# Patient Record
Sex: MALE | Race: WHITE | NOT HISPANIC OR LATINO | Employment: OTHER | URBAN - METROPOLITAN AREA
[De-identification: names, ages, dates, MRNs, and addresses within clinical notes are randomized per-mention and may not be internally consistent; named-entity substitution may affect disease eponyms.]

---

## 2019-10-25 ENCOUNTER — TRANSFERRED RECORDS (OUTPATIENT)
Dept: HEALTH INFORMATION MANAGEMENT | Facility: CLINIC | Age: 54
End: 2019-10-25

## 2020-07-02 ENCOUNTER — TRANSFERRED RECORDS (OUTPATIENT)
Dept: HEALTH INFORMATION MANAGEMENT | Facility: CLINIC | Age: 55
End: 2020-07-02

## 2020-07-08 ENCOUNTER — TRANSFERRED RECORDS (OUTPATIENT)
Dept: HEALTH INFORMATION MANAGEMENT | Facility: CLINIC | Age: 55
End: 2020-07-08

## 2023-07-31 ENCOUNTER — TRANSFERRED RECORDS (OUTPATIENT)
Dept: HEALTH INFORMATION MANAGEMENT | Facility: CLINIC | Age: 58
End: 2023-07-31

## 2023-08-07 ENCOUNTER — TRANSFERRED RECORDS (OUTPATIENT)
Dept: HEALTH INFORMATION MANAGEMENT | Facility: CLINIC | Age: 58
End: 2023-08-07

## 2023-08-24 ENCOUNTER — TRANSFERRED RECORDS (OUTPATIENT)
Dept: HEALTH INFORMATION MANAGEMENT | Facility: CLINIC | Age: 58
End: 2023-08-24

## 2023-08-24 ENCOUNTER — LAB REQUISITION (OUTPATIENT)
Dept: LAB | Facility: CLINIC | Age: 58
End: 2023-08-24
Payer: COMMERCIAL

## 2023-08-24 DIAGNOSIS — M76.899 OTHER SPECIFIED ENTHESOPATHIES OF UNSPECIFIED LOWER LIMB, EXCLUDING FOOT: ICD-10-CM

## 2023-08-24 PROCEDURE — 87077 CULTURE AEROBIC IDENTIFY: CPT | Mod: ORL,59 | Performed by: ORTHOPAEDIC SURGERY

## 2023-08-24 PROCEDURE — 87075 CULTR BACTERIA EXCEPT BLOOD: CPT | Mod: ORL | Performed by: ORTHOPAEDIC SURGERY

## 2023-08-24 PROCEDURE — 87176 TISSUE HOMOGENIZATION CULTR: CPT | Mod: ORL | Performed by: ORTHOPAEDIC SURGERY

## 2023-08-24 PROCEDURE — 87077 CULTURE AEROBIC IDENTIFY: CPT | Mod: ORL | Performed by: ORTHOPAEDIC SURGERY

## 2023-08-26 LAB — BACTERIA TISS BX CULT: ABNORMAL

## 2023-08-28 LAB
BACTERIA TISS BX CULT: ABNORMAL
BACTERIA TISS BX CULT: ABNORMAL

## 2023-08-29 LAB — BACTERIA TISS BX CULT: ABNORMAL

## 2023-08-31 LAB
BACTERIA TISS BX CULT: NORMAL

## 2023-09-07 ENCOUNTER — MEDICAL CORRESPONDENCE (OUTPATIENT)
Dept: HEALTH INFORMATION MANAGEMENT | Facility: CLINIC | Age: 58
End: 2023-09-07
Payer: COMMERCIAL

## 2023-09-07 ENCOUNTER — TRANSFERRED RECORDS (OUTPATIENT)
Dept: HEALTH INFORMATION MANAGEMENT | Facility: CLINIC | Age: 58
End: 2023-09-07

## 2023-09-08 ENCOUNTER — ANCILLARY PROCEDURE (OUTPATIENT)
Dept: OTHER | Facility: HOSPITAL | Age: 58
End: 2023-09-08
Payer: COMMERCIAL

## 2023-09-08 NOTE — PROGRESS NOTES
"    PICC Line Insertion Procedure Note  Pt. Name: Reji Solorzano  MRN:        [id number]    Procedure: Insertion of a Single Lumen  4 fr  Bard SOLO (valved) Power PICC, Lot number [id number]    Indications: Antibiotics    Contraindications : none    Procedure Details   Patient identified with 2 identifiers and \"Time Out\" conducted.  .     Central line insertion bundle followed: hand hygeine performed prior to procedure, site cleansed with cholraprep, hat, mask, sterile gloves,sterile gown worn, patient draped with maximum barrier head to toe drape, sterile field maintained.    Vein was assessed and found to be compressible and of adequate size. Two ml 1% Lidocaine administered sq to the insertion site. A 4 Fr PICC was inserted into the basilic vein of the right arm with ultrasound guidance. With one attempt(s) required to access vein.   Catheter threaded without difficulty. Good blood return noted.    Modified Seldinger Technique used for insertion.    The 8 sharps that are included in the PICC kit were accounted for and disposed of in the sharps container prior to the breakdown of the sterile field.     Catheter secured with Statlock, biopatch and Tegaderm dressing applied.    Findings:  Total catheter length  43 cm, with 1 cm exposed. Mid upper arm circumference is 43 cm. Catheter was flushed with 10 cc NS. Patient  tolerated procedure well.    Tip placement verified by 3 CG technology                  CLABSI prevention brochure left at bedside.    Patient's primary RN notified PICC is ready for use.    Comments:          Elysia Crump, RN  PICC Team    [facility]  [phone]    "

## 2023-09-11 DIAGNOSIS — B99.9 INFECTION: Primary | ICD-10-CM

## 2023-09-11 RX ORDER — EPINEPHRINE 1 MG/ML
0.3 INJECTION, SOLUTION, CONCENTRATE INTRAVENOUS EVERY 5 MIN PRN
Status: CANCELLED | OUTPATIENT
Start: 2023-09-12

## 2023-09-11 RX ORDER — ALBUTEROL SULFATE 0.83 MG/ML
2.5 SOLUTION RESPIRATORY (INHALATION)
Status: CANCELLED | OUTPATIENT
Start: 2023-09-12

## 2023-09-11 RX ORDER — METHYLPREDNISOLONE SODIUM SUCCINATE 125 MG/2ML
125 INJECTION, POWDER, LYOPHILIZED, FOR SOLUTION INTRAMUSCULAR; INTRAVENOUS
Status: CANCELLED
Start: 2023-09-12

## 2023-09-11 RX ORDER — DIPHENHYDRAMINE HYDROCHLORIDE 50 MG/ML
50 INJECTION INTRAMUSCULAR; INTRAVENOUS
Status: CANCELLED
Start: 2023-09-12

## 2023-09-11 RX ORDER — ALBUTEROL SULFATE 90 UG/1
1-2 AEROSOL, METERED RESPIRATORY (INHALATION)
Status: CANCELLED
Start: 2023-09-12

## 2023-09-11 RX ORDER — MEPERIDINE HYDROCHLORIDE 25 MG/ML
25 INJECTION INTRAMUSCULAR; INTRAVENOUS; SUBCUTANEOUS EVERY 30 MIN PRN
Status: CANCELLED | OUTPATIENT
Start: 2023-09-12

## 2023-09-11 RX ORDER — HEPARIN SODIUM,PORCINE 10 UNIT/ML
5-20 VIAL (ML) INTRAVENOUS DAILY PRN
Status: CANCELLED | OUTPATIENT
Start: 2023-09-12

## 2023-09-11 RX ORDER — HEPARIN SODIUM (PORCINE) LOCK FLUSH IV SOLN 100 UNIT/ML 100 UNIT/ML
5 SOLUTION INTRAVENOUS
Status: CANCELLED | OUTPATIENT
Start: 2023-09-12

## 2023-09-12 ENCOUNTER — LAB REQUISITION (OUTPATIENT)
Dept: LAB | Facility: HOSPITAL | Age: 58
End: 2023-09-12
Payer: COMMERCIAL

## 2023-09-12 DIAGNOSIS — M86.251: ICD-10-CM

## 2023-09-12 LAB
AST SERPL W P-5'-P-CCNC: 34 U/L (ref 0–45)
BASOPHILS # BLD AUTO: 0.1 10E3/UL (ref 0–0.2)
BASOPHILS NFR BLD AUTO: 0 %
CREAT SERPL-MCNC: 0.72 MG/DL (ref 0.67–1.17)
CRP SERPL-MCNC: 6.9 MG/L
EGFRCR SERPLBLD CKD-EPI 2021: >90 ML/MIN/1.73M2
EOSINOPHIL # BLD AUTO: 0.1 10E3/UL (ref 0–0.7)
EOSINOPHIL NFR BLD AUTO: 1 %
ERYTHROCYTE [DISTWIDTH] IN BLOOD BY AUTOMATED COUNT: 13.7 % (ref 10–15)
ERYTHROCYTE [SEDIMENTATION RATE] IN BLOOD BY WESTERGREN METHOD: 18 MM/HR (ref 0–20)
HCT VFR BLD AUTO: 38.1 % (ref 40–53)
HGB BLD-MCNC: 12.4 G/DL (ref 13.3–17.7)
IMM GRANULOCYTES # BLD: 0.1 10E3/UL
IMM GRANULOCYTES NFR BLD: 0 %
LYMPHOCYTES # BLD AUTO: 1.5 10E3/UL (ref 0.8–5.3)
LYMPHOCYTES NFR BLD AUTO: 13 %
MCH RBC QN AUTO: 28.2 PG (ref 26.5–33)
MCHC RBC AUTO-ENTMCNC: 32.5 G/DL (ref 31.5–36.5)
MCV RBC AUTO: 87 FL (ref 78–100)
MONOCYTES # BLD AUTO: 0.6 10E3/UL (ref 0–1.3)
MONOCYTES NFR BLD AUTO: 5 %
NEUTROPHILS # BLD AUTO: 9.4 10E3/UL (ref 1.6–8.3)
NEUTROPHILS NFR BLD AUTO: 81 %
NRBC # BLD AUTO: 0 10E3/UL
NRBC BLD AUTO-RTO: 0 /100
PLATELET # BLD AUTO: 341 10E3/UL (ref 150–450)
RBC # BLD AUTO: 4.4 10E6/UL (ref 4.4–5.9)
WBC # BLD AUTO: 11.7 10E3/UL (ref 4–11)

## 2023-09-12 PROCEDURE — 86140 C-REACTIVE PROTEIN: CPT | Performed by: SPECIALIST

## 2023-09-12 PROCEDURE — 84450 TRANSFERASE (AST) (SGOT): CPT | Performed by: SPECIALIST

## 2023-09-12 PROCEDURE — 85652 RBC SED RATE AUTOMATED: CPT | Performed by: SPECIALIST

## 2023-09-12 PROCEDURE — 85025 COMPLETE CBC W/AUTO DIFF WBC: CPT | Performed by: SPECIALIST

## 2023-09-12 PROCEDURE — 82565 ASSAY OF CREATININE: CPT | Performed by: SPECIALIST

## 2023-09-19 ENCOUNTER — LAB REQUISITION (OUTPATIENT)
Dept: LAB | Facility: CLINIC | Age: 58
End: 2023-09-19
Payer: COMMERCIAL

## 2023-09-19 DIAGNOSIS — M86.251: ICD-10-CM

## 2023-09-19 LAB
AST SERPL W P-5'-P-CCNC: 57 U/L (ref 0–45)
BASOPHILS # BLD AUTO: 0 10E3/UL (ref 0–0.2)
BASOPHILS NFR BLD AUTO: 1 %
CREAT SERPL-MCNC: 0.76 MG/DL (ref 0.67–1.17)
CRP SERPL-MCNC: 9.84 MG/L
EGFRCR SERPLBLD CKD-EPI 2021: >90 ML/MIN/1.73M2
EOSINOPHIL # BLD AUTO: 0.1 10E3/UL (ref 0–0.7)
EOSINOPHIL NFR BLD AUTO: 1 %
ERYTHROCYTE [DISTWIDTH] IN BLOOD BY AUTOMATED COUNT: 13.8 % (ref 10–15)
ERYTHROCYTE [SEDIMENTATION RATE] IN BLOOD BY WESTERGREN METHOD: 12 MM/HR (ref 0–20)
HCT VFR BLD AUTO: 35.1 % (ref 40–53)
HGB BLD-MCNC: 11.6 G/DL (ref 13.3–17.7)
HOLD SPECIMEN: NORMAL
IMM GRANULOCYTES # BLD: 0 10E3/UL
IMM GRANULOCYTES NFR BLD: 1 %
LYMPHOCYTES # BLD AUTO: 0.9 10E3/UL (ref 0.8–5.3)
LYMPHOCYTES NFR BLD AUTO: 10 %
MCH RBC QN AUTO: 28.7 PG (ref 26.5–33)
MCHC RBC AUTO-ENTMCNC: 33 G/DL (ref 31.5–36.5)
MCV RBC AUTO: 87 FL (ref 78–100)
MONOCYTES # BLD AUTO: 0.4 10E3/UL (ref 0–1.3)
MONOCYTES NFR BLD AUTO: 5 %
NEUTROPHILS # BLD AUTO: 7.4 10E3/UL (ref 1.6–8.3)
NEUTROPHILS NFR BLD AUTO: 82 %
NRBC # BLD AUTO: 0 10E3/UL
NRBC BLD AUTO-RTO: 0 /100
PLATELET # BLD AUTO: 218 10E3/UL (ref 150–450)
RBC # BLD AUTO: 4.04 10E6/UL (ref 4.4–5.9)
WBC # BLD AUTO: 8.8 10E3/UL (ref 4–11)

## 2023-09-19 PROCEDURE — 84450 TRANSFERASE (AST) (SGOT): CPT

## 2023-09-19 PROCEDURE — 82565 ASSAY OF CREATININE: CPT

## 2023-09-19 PROCEDURE — 85652 RBC SED RATE AUTOMATED: CPT

## 2023-09-19 PROCEDURE — 85025 COMPLETE CBC W/AUTO DIFF WBC: CPT

## 2023-09-19 PROCEDURE — 86140 C-REACTIVE PROTEIN: CPT

## 2023-09-22 ENCOUNTER — DOCUMENTATION ONLY (OUTPATIENT)
Dept: PHARMACY | Facility: CLINIC | Age: 58
End: 2023-09-22
Payer: COMMERCIAL

## 2023-09-22 NOTE — PROGRESS NOTES
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 He was wondering if he would be able to end his antibiotics a few days early so he doesn't have to do while he is out of town.       Ysabel Rodriguez RN, BSN  [facility]  [email or web address]  [phone]

## 2023-09-26 ENCOUNTER — LAB REQUISITION (OUTPATIENT)
Dept: LAB | Facility: CLINIC | Age: 58
End: 2023-09-26
Payer: COMMERCIAL

## 2023-09-26 DIAGNOSIS — M86.251: ICD-10-CM

## 2023-09-26 LAB
AST SERPL W P-5'-P-CCNC: 33 U/L (ref 0–45)
BASOPHILS # BLD AUTO: 0 10E3/UL (ref 0–0.2)
BASOPHILS NFR BLD AUTO: 0 %
CREAT SERPL-MCNC: 0.78 MG/DL (ref 0.67–1.17)
CRP SERPL-MCNC: <3 MG/L
EGFRCR SERPLBLD CKD-EPI 2021: >90 ML/MIN/1.73M2
EOSINOPHIL # BLD AUTO: 0.1 10E3/UL (ref 0–0.7)
EOSINOPHIL NFR BLD AUTO: 1 %
ERYTHROCYTE [DISTWIDTH] IN BLOOD BY AUTOMATED COUNT: 14.6 % (ref 10–15)
ERYTHROCYTE [SEDIMENTATION RATE] IN BLOOD BY WESTERGREN METHOD: 11 MM/HR (ref 0–20)
HCT VFR BLD AUTO: 37.5 % (ref 40–53)
HGB BLD-MCNC: 12.3 G/DL (ref 13.3–17.7)
HOLD SPECIMEN: NORMAL
IMM GRANULOCYTES # BLD: 0 10E3/UL
IMM GRANULOCYTES NFR BLD: 0 %
LYMPHOCYTES # BLD AUTO: 0.9 10E3/UL (ref 0.8–5.3)
LYMPHOCYTES NFR BLD AUTO: 12 %
MCH RBC QN AUTO: 28.9 PG (ref 26.5–33)
MCHC RBC AUTO-ENTMCNC: 32.8 G/DL (ref 31.5–36.5)
MCV RBC AUTO: 88 FL (ref 78–100)
MONOCYTES # BLD AUTO: 0.4 10E3/UL (ref 0–1.3)
MONOCYTES NFR BLD AUTO: 5 %
NEUTROPHILS # BLD AUTO: 6.6 10E3/UL (ref 1.6–8.3)
NEUTROPHILS NFR BLD AUTO: 82 %
NRBC # BLD AUTO: 0 10E3/UL
NRBC BLD AUTO-RTO: 0 /100
PLATELET # BLD AUTO: 187 10E3/UL (ref 150–450)
RBC # BLD AUTO: 4.26 10E6/UL (ref 4.4–5.9)
WBC # BLD AUTO: 8.1 10E3/UL (ref 4–11)

## 2023-09-26 PROCEDURE — 86140 C-REACTIVE PROTEIN: CPT | Performed by: SPECIALIST

## 2023-09-26 PROCEDURE — 85025 COMPLETE CBC W/AUTO DIFF WBC: CPT | Performed by: SPECIALIST

## 2023-09-26 PROCEDURE — 82565 ASSAY OF CREATININE: CPT | Performed by: SPECIALIST

## 2023-09-26 PROCEDURE — 84450 TRANSFERASE (AST) (SGOT): CPT | Performed by: SPECIALIST

## 2023-09-26 PROCEDURE — 85652 RBC SED RATE AUTOMATED: CPT | Performed by: SPECIALIST

## 2023-10-03 ENCOUNTER — LAB REQUISITION (OUTPATIENT)
Dept: LAB | Facility: CLINIC | Age: 58
End: 2023-10-03
Payer: COMMERCIAL

## 2023-10-03 DIAGNOSIS — M86.251: ICD-10-CM

## 2023-10-03 LAB
AST SERPL W P-5'-P-CCNC: 43 U/L (ref 0–45)
BASO+EOS+MONOS # BLD AUTO: ABNORMAL 10*3/UL
BASO+EOS+MONOS NFR BLD AUTO: ABNORMAL %
BASOPHILS # BLD AUTO: 0 10E3/UL (ref 0–0.2)
BASOPHILS NFR BLD AUTO: 0 %
CREAT SERPL-MCNC: 0.74 MG/DL (ref 0.67–1.17)
CRP SERPL-MCNC: 3.97 MG/L
EGFRCR SERPLBLD CKD-EPI 2021: >90 ML/MIN/1.73M2
EOSINOPHIL # BLD AUTO: 0 10E3/UL (ref 0–0.7)
EOSINOPHIL NFR BLD AUTO: 1 %
ERYTHROCYTE [DISTWIDTH] IN BLOOD BY AUTOMATED COUNT: 14.6 % (ref 10–15)
ERYTHROCYTE [SEDIMENTATION RATE] IN BLOOD BY WESTERGREN METHOD: 7 MM/HR (ref 0–20)
HCT VFR BLD AUTO: 37.5 % (ref 40–53)
HGB BLD-MCNC: 12.4 G/DL (ref 13.3–17.7)
HOLD SPECIMEN: NORMAL
IMM GRANULOCYTES # BLD: 0 10E3/UL
IMM GRANULOCYTES NFR BLD: 0 %
LYMPHOCYTES # BLD AUTO: 1 10E3/UL (ref 0.8–5.3)
LYMPHOCYTES NFR BLD AUTO: 14 %
MCH RBC QN AUTO: 28.9 PG (ref 26.5–33)
MCHC RBC AUTO-ENTMCNC: 33.1 G/DL (ref 31.5–36.5)
MCV RBC AUTO: 87 FL (ref 78–100)
MONOCYTES # BLD AUTO: 0.3 10E3/UL (ref 0–1.3)
MONOCYTES NFR BLD AUTO: 5 %
NEUTROPHILS # BLD AUTO: 5.3 10E3/UL (ref 1.6–8.3)
NEUTROPHILS NFR BLD AUTO: 80 %
NRBC # BLD AUTO: 0 10E3/UL
NRBC BLD AUTO-RTO: 0 /100
PLATELET # BLD AUTO: 197 10E3/UL (ref 150–450)
RBC # BLD AUTO: 4.29 10E6/UL (ref 4.4–5.9)
WBC # BLD AUTO: 6.7 10E3/UL (ref 4–11)

## 2023-10-03 PROCEDURE — 85652 RBC SED RATE AUTOMATED: CPT | Performed by: SPECIALIST

## 2023-10-03 PROCEDURE — 86140 C-REACTIVE PROTEIN: CPT | Performed by: SPECIALIST

## 2023-10-03 PROCEDURE — 82565 ASSAY OF CREATININE: CPT | Performed by: SPECIALIST

## 2023-10-03 PROCEDURE — 85025 COMPLETE CBC W/AUTO DIFF WBC: CPT | Performed by: SPECIALIST

## 2023-10-03 PROCEDURE — 84450 TRANSFERASE (AST) (SGOT): CPT | Performed by: SPECIALIST

## 2023-10-10 ENCOUNTER — LAB REQUISITION (OUTPATIENT)
Dept: LAB | Facility: HOSPITAL | Age: 58
End: 2023-10-10
Payer: COMMERCIAL

## 2023-10-10 DIAGNOSIS — M86.251: ICD-10-CM

## 2023-10-10 LAB
AST SERPL W P-5'-P-CCNC: 41 U/L (ref 0–45)
BASO+EOS+MONOS # BLD AUTO: ABNORMAL 10*3/UL
BASO+EOS+MONOS NFR BLD AUTO: ABNORMAL %
BASOPHILS # BLD AUTO: 0 10E3/UL (ref 0–0.2)
BASOPHILS NFR BLD AUTO: 0 %
CREAT SERPL-MCNC: 0.6 MG/DL (ref 0.67–1.17)
CRP SERPL-MCNC: 7 MG/L
EGFRCR SERPLBLD CKD-EPI 2021: >90 ML/MIN/1.73M2
EOSINOPHIL # BLD AUTO: 0 10E3/UL (ref 0–0.7)
EOSINOPHIL NFR BLD AUTO: 1 %
ERYTHROCYTE [DISTWIDTH] IN BLOOD BY AUTOMATED COUNT: 14.6 % (ref 10–15)
ERYTHROCYTE [SEDIMENTATION RATE] IN BLOOD BY WESTERGREN METHOD: 9 MM/HR (ref 0–20)
HCT VFR BLD AUTO: 38.3 % (ref 40–53)
HGB BLD-MCNC: 12.7 G/DL (ref 13.3–17.7)
IMM GRANULOCYTES # BLD: 0 10E3/UL
IMM GRANULOCYTES NFR BLD: 0 %
LYMPHOCYTES # BLD AUTO: 0.9 10E3/UL (ref 0.8–5.3)
LYMPHOCYTES NFR BLD AUTO: 13 %
MCH RBC QN AUTO: 28.9 PG (ref 26.5–33)
MCHC RBC AUTO-ENTMCNC: 33.2 G/DL (ref 31.5–36.5)
MCV RBC AUTO: 87 FL (ref 78–100)
MONOCYTES # BLD AUTO: 0.4 10E3/UL (ref 0–1.3)
MONOCYTES NFR BLD AUTO: 6 %
NEUTROPHILS # BLD AUTO: 5.5 10E3/UL (ref 1.6–8.3)
NEUTROPHILS NFR BLD AUTO: 80 %
NRBC # BLD AUTO: 0 10E3/UL
NRBC BLD AUTO-RTO: 0 /100
PLATELET # BLD AUTO: 215 10E3/UL (ref 150–450)
RBC # BLD AUTO: 4.4 10E6/UL (ref 4.4–5.9)
WBC # BLD AUTO: 6.8 10E3/UL (ref 4–11)

## 2023-10-10 PROCEDURE — 85652 RBC SED RATE AUTOMATED: CPT

## 2023-10-10 PROCEDURE — 84450 TRANSFERASE (AST) (SGOT): CPT

## 2023-10-10 PROCEDURE — 86140 C-REACTIVE PROTEIN: CPT

## 2023-10-10 PROCEDURE — 85014 HEMATOCRIT: CPT

## 2023-10-10 PROCEDURE — 82565 ASSAY OF CREATININE: CPT

## 2023-10-16 ENCOUNTER — LAB REQUISITION (OUTPATIENT)
Dept: LAB | Facility: CLINIC | Age: 58
End: 2023-10-16
Payer: COMMERCIAL

## 2023-10-16 DIAGNOSIS — M86.251: ICD-10-CM

## 2023-10-16 LAB
AST SERPL W P-5'-P-CCNC: 32 U/L (ref 0–45)
BASO+EOS+MONOS # BLD AUTO: ABNORMAL 10*3/UL
BASO+EOS+MONOS NFR BLD AUTO: ABNORMAL %
BASOPHILS # BLD AUTO: 0 10E3/UL (ref 0–0.2)
BASOPHILS NFR BLD AUTO: 1 %
CREAT SERPL-MCNC: 0.68 MG/DL (ref 0.67–1.17)
CRP SERPL-MCNC: <3 MG/L
EGFRCR SERPLBLD CKD-EPI 2021: >90 ML/MIN/1.73M2
EOSINOPHIL # BLD AUTO: 0 10E3/UL (ref 0–0.7)
EOSINOPHIL NFR BLD AUTO: 0 %
ERYTHROCYTE [DISTWIDTH] IN BLOOD BY AUTOMATED COUNT: 14.6 % (ref 10–15)
ERYTHROCYTE [SEDIMENTATION RATE] IN BLOOD BY WESTERGREN METHOD: 7 MM/HR (ref 0–20)
HCT VFR BLD AUTO: 36.4 % (ref 40–53)
HGB BLD-MCNC: 12 G/DL (ref 13.3–17.7)
IMM GRANULOCYTES # BLD: 0 10E3/UL
IMM GRANULOCYTES NFR BLD: 0 %
LYMPHOCYTES # BLD AUTO: 0.7 10E3/UL (ref 0.8–5.3)
LYMPHOCYTES NFR BLD AUTO: 8 %
MCH RBC QN AUTO: 28.8 PG (ref 26.5–33)
MCHC RBC AUTO-ENTMCNC: 33 G/DL (ref 31.5–36.5)
MCV RBC AUTO: 87 FL (ref 78–100)
MONOCYTES # BLD AUTO: 0.5 10E3/UL (ref 0–1.3)
MONOCYTES NFR BLD AUTO: 6 %
NEUTROPHILS # BLD AUTO: 7.3 10E3/UL (ref 1.6–8.3)
NEUTROPHILS NFR BLD AUTO: 85 %
NRBC # BLD AUTO: 0 10E3/UL
NRBC BLD AUTO-RTO: 0 /100
PLATELET # BLD AUTO: 203 10E3/UL (ref 150–450)
RBC # BLD AUTO: 4.17 10E6/UL (ref 4.4–5.9)
WBC # BLD AUTO: 8.6 10E3/UL (ref 4–11)

## 2023-10-16 PROCEDURE — 85652 RBC SED RATE AUTOMATED: CPT | Performed by: SPECIALIST

## 2023-10-16 PROCEDURE — 86140 C-REACTIVE PROTEIN: CPT | Performed by: SPECIALIST

## 2023-10-16 PROCEDURE — 84450 TRANSFERASE (AST) (SGOT): CPT | Performed by: SPECIALIST

## 2023-10-16 PROCEDURE — 82565 ASSAY OF CREATININE: CPT | Performed by: SPECIALIST

## 2023-10-16 PROCEDURE — 85025 COMPLETE CBC W/AUTO DIFF WBC: CPT | Performed by: SPECIALIST

## 2024-05-07 ENCOUNTER — TRANSFERRED RECORDS (OUTPATIENT)
Dept: HEALTH INFORMATION MANAGEMENT | Facility: CLINIC | Age: 59
End: 2024-05-07

## 2024-05-14 ENCOUNTER — TRANSFERRED RECORDS (OUTPATIENT)
Dept: HEALTH INFORMATION MANAGEMENT | Facility: CLINIC | Age: 59
End: 2024-05-14

## 2024-05-14 ENCOUNTER — LAB REQUISITION (OUTPATIENT)
Dept: LAB | Facility: CLINIC | Age: 59
End: 2024-05-14
Payer: COMMERCIAL

## 2024-05-14 DIAGNOSIS — M79.652 PAIN IN LEFT THIGH: ICD-10-CM

## 2024-05-14 LAB
BACTERIA TISS BX CULT: NORMAL
GRAM STAIN RESULT: NORMAL
GRAM STAIN RESULT: NORMAL

## 2024-05-14 PROCEDURE — 87075 CULTR BACTERIA EXCEPT BLOOD: CPT | Mod: ORL | Performed by: ORTHOPAEDIC SURGERY

## 2024-05-14 PROCEDURE — 87205 SMEAR GRAM STAIN: CPT | Mod: ORL | Performed by: ORTHOPAEDIC SURGERY

## 2024-05-14 PROCEDURE — 87070 CULTURE OTHR SPECIMN AEROBIC: CPT | Mod: ORL | Performed by: ORTHOPAEDIC SURGERY

## 2024-05-19 LAB — BACTERIA TISS BX CULT: NO GROWTH

## 2024-05-28 LAB — BACTERIA TISS BX CULT: NORMAL

## 2024-05-29 ENCOUNTER — TRANSFERRED RECORDS (OUTPATIENT)
Dept: HEALTH INFORMATION MANAGEMENT | Facility: CLINIC | Age: 59
End: 2024-05-29

## 2024-05-30 ENCOUNTER — TRANSFERRED RECORDS (OUTPATIENT)
Dept: HEALTH INFORMATION MANAGEMENT | Facility: CLINIC | Age: 59
End: 2024-05-30

## 2024-09-09 ENCOUNTER — DOCUMENTATION ONLY (OUTPATIENT)
Dept: ORTHOPEDICS | Facility: CLINIC | Age: 59
End: 2024-09-09
Payer: COMMERCIAL

## 2024-09-09 NOTE — PROGRESS NOTES
We've received an email referral for Reji. I asked him to verify what the referral is for, as the nurse just sent records and no specific referral or question. I have also left her a voicemail asking for more information on the referral - Farhana Sun with Dr Dorsey at [facility]. Reji states he has a 3rd infection since a hamstring repair surgery in 2020 and his provider said he'd like to send him to us. This is a unique referral so I scheduled him in the next available appointment spot with Dr Rogers and will check with the team to see if appropriate. I will call Reji back if we need to move the appointment date or to another provider. He understands and is grateful. Date, time and location were confirmed with the patient. With his consent, I have also sent the patient a text link to sign up for [employer].  Stacie Vega ATC

## 2024-09-10 NOTE — PROGRESS NOTES
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 He has had two previous surgeries to try and clear the infection. He has been seen by infectious disease. He grew out bacteria that we normally only see in immunocompromised patients which I believe he is not. The whole situation is complex and he may need another surgery to have a large portion of his ischium removed which is a complicated surgery which I am not capable of and usually done by a tumor surgeon. Dr. Ruggiero and I both agree another opinion by himself or Dr. Silva would be most appropriate. Please let me know if you need any further information. I am happy to talk with Dr. Silva directly. I just don't have his cell number.

## 2024-09-10 NOTE — PROGRESS NOTES
ATC called pt multiple times to reschedule apt from Dr. Rogers to Dr. Silva. Left a VM for call back. Please reschedule apt to Dr. Silva when pt calls back. Ok to use one of the hold spots on 9/20/24.        -BALJEET Tillman- [facility]

## 2024-09-12 ENCOUNTER — TELEPHONE (OUTPATIENT)
Dept: ORTHOPEDICS | Facility: CLINIC | Age: 59
End: 2024-09-12
Payer: COMMERCIAL

## 2024-09-12 NOTE — TELEPHONE ENCOUNTER
Patient confirmed scheduled appointment:  Date: 9/20/24  Time: 8:00am  Visit type: NEW MSK  Provider:   Location: [facility]

## 2024-09-18 NOTE — TELEPHONE ENCOUNTER
Action September 18, 2024 12:56 PM MT   Action Taken Sent a request for records and imaging from [facility].  Resolved [facility] imaging, sent radiology reports to scan.  Sent a request for imaging from [facility].     Action September 19, 2024 2:44 PM MT   Action Taken Called [facility] for missing op notes to be faxed STAT.     DIAGNOSIS: LEFT LEG CHRONIC INFECTION   APPOINTMENT DATE: 09/20/2024   NOTES STATUS DETAILS   OFFICE NOTE from referring provider Media Tab Toy Dorsey MD - [facility]   OFFICE NOTE from other specialist Care Everywhere 05/30/2024 - [facility] - Nephrology   OPERATIVE REPORT  Media Tab:  05/14/2024 - Open excisional debridement, LT posterior thigh, with sharp excisional debridement of the skin, subcutaneous tissue, and muscle. LT thigh primary closure of an open wound measuring approximately 5cm in length.    08/24/2023 - Open excisional debridement, RT posterior thigh, with sharp debridement of the skin, subcutaneous tissue, and muscle. RT proximal hamstring foreign body removal of previous suture anchors. I & D RT Proximal Hamstring. Excisional debridement, LT proximal hamstring, with sharp excisional debridement of the skin, subcutaneous tissue, and muscle. LT proximal hamstring I&D. Cultures of bilateral proximal hamstring regions.    IN PROCESS:  Right proximal hamstring repair on 7/8/2020 and left proximal hamstring repair on 10/25/2019.    Care Everywhere:  06/21/2018 - Left knee arthroscopy, partial medial meniscectomy.   MRI PACS [facility]: PACS  05/07/2024, 08/07/2023 - RT Femur  07/31/2023 - Pelvis  07/02/2020 - RT Hamstrings      [facility]:  10/09/2019 - LT Femur  06/05/2018 - LT Knee   ULTRASOUND PACS [facility]:  10/02/2019 - Left Lower Extremity   XRAYS (IMAGES & REPORTS) PACS [facility]:  05/04/2023 - Hip/Pelvis    [facility]:  06/08/2018 - Bilateral Knee  06/04/2018 - LT Knee   CULTURES Internal 05/14/2024, 08/24/2023 - Aerobic/Anaerobic/Gram Stain

## 2024-09-20 ENCOUNTER — PRE VISIT (OUTPATIENT)
Dept: ORTHOPEDICS | Facility: CLINIC | Age: 59
End: 2024-09-20

## 2024-09-20 ENCOUNTER — OFFICE VISIT (OUTPATIENT)
Dept: ORTHOPEDICS | Facility: CLINIC | Age: 59
End: 2024-09-20
Payer: COMMERCIAL

## 2024-09-20 ENCOUNTER — TELEPHONE (OUTPATIENT)
Dept: ORTHOPEDICS | Facility: CLINIC | Age: 59
End: 2024-09-20

## 2024-09-20 VITALS — HEIGHT: 65 IN | BODY MASS INDEX: 23.44 KG/M2 | WEIGHT: 140.7 LBS

## 2024-09-20 DIAGNOSIS — T81.49XA WOUND INFECTION AFTER SURGERY: Primary | ICD-10-CM

## 2024-09-20 PROCEDURE — 99204 OFFICE O/P NEW MOD 45 MIN: CPT | Performed by: ORTHOPAEDIC SURGERY

## 2024-09-20 NOTE — PROGRESS NOTES
This patient has a history of bilateral hamstring repairs at the ischial tuberosity.  In August 20 23 he had infections of both sides and has been debrided.  On the right side he has the suture anchors removed.  In May 2024 the left wound opened up again and he had another debridement and a course of antibiotics.  He does not seem to have bone debridement on the left side and either of these surgeries.  His wound now has opened up again on the left side although it is relatively minor.  He is not complaining of any pain at this site.    On examination the patient is alert oriented has a normal mood and affect and is in no acute distress.  He is able to ambulate without a limp.  He is thin.  There is no edema or erythema in the left lower extremity.  At the inferior gluteal crease at the patient's old scar he has a small wound that is about 2 x 2 mm in diameter.  I am able to express some clear fluid from it.  It is not tender.  There are some underlying firm scar tissue which may represent his hamstring.  He has a normal motion of the hip knee and ankle.    I reviewed the patient's previous imaging which shows a defect in his right ischial tuberosity but no obvious osteomyelitis on the left.  This is from May 2024.    This patient has a new or persistent infection in this left side.  This may indicate some infected foreign material or necrotic material that is persisting in the wound.  While we could go with a course of antibiotics I think it is more likely that surgical debridement would give this patient a durable solution to this problem.  The patient is in agreement and we will schedule soft tissue debridement as soon as we are able to get our schedules to align.  Have answered all the patient's questions today.

## 2024-09-20 NOTE — NURSING NOTE
"Reason For Visit:   Chief Complaint   Patient presents with    Consult     Left leg chronic infection        Ht 1.64 m (5' 4.57\")   Wt 63.8 kg (140 lb 11.2 oz)   BMI 23.73 kg/m      Pain Assessment  Patient Currently in Pain: Dionisioies    Karrie Ledezma LPN    "

## 2024-09-20 NOTE — LETTER
9/20/2024      Reji Solorzano  [address] [address]      Dear Colleague,    Thank you for referring your patient, Reji Solorzano, to the [facility]. Please see a copy of my visit note below.    This patient has a history of bilateral hamstring repairs at the ischial tuberosity.  In August 20 23 he had infections of both sides and has been debrided.  On the right side he has the suture anchors removed.  In May 2024 the left wound opened up again and he had another debridement and a course of antibiotics.  He does not seem to have bone debridement on the left side and either of these surgeries.  His wound now has opened up again on the left side although it is relatively minor.  He is not complaining of any pain at this site.    On examination the patient is alert oriented has a normal mood and affect and is in no acute distress.  He is able to ambulate without a limp.  He is thin.  There is no edema or erythema in the left lower extremity.  At the inferior gluteal crease at the patient's old scar he has a small wound that is about 2 x 2 mm in diameter.  I am able to express some clear fluid from it.  It is not tender.  There are some underlying firm scar tissue which may represent his hamstring.  He has a normal motion of the hip knee and ankle.    I reviewed the patient's previous imaging which shows a defect in his right ischial tuberosity but no obvious osteomyelitis on the left.  This is from May 2024.    This patient has a new or persistent infection in this left side.  This may indicate some infected foreign material or necrotic material that is persisting in the wound.  While we could go with a course of antibiotics I think it is more likely that surgical debridement would give this patient a durable solution to this problem.  The patient is in agreement and we will schedule soft tissue debridement as soon as we are able to get our schedules to align.   Have answered all the patient's questions today.      Again, thank you for allowing me to participate in the care of your patient.        Sincerely,        Ronald Silva MD

## 2024-09-20 NOTE — TELEPHONE ENCOUNTER
- A call was placed to the patient. Patient did not answer phone so a voicemail was left.     - I told the patient I was calling to go over pre-op teaching on their proposed procedure with Dr. Silva.     - Call back number to clinic was given and patient was told to call back and ask for Dr. Ken Mluligan's nurse when they were able.

## 2024-09-20 NOTE — TELEPHONE ENCOUNTER
A call was placed to the patient and pre-op teaching was performed over the phone.    Teaching Flowsheet   Relevant Diagnosis: Pre-Op Teaching  Teaching Topic: Debridement left posterior hip wound      Person(s) involved in teaching:   Patient     Motivation Level:  Asks Questions: Yes  Eager to Learn: Yes  Cooperative: Yes  Receptive (willing/able to accept information): Yes  Any cultural factors/Yarsanism beliefs that may influence understanding or compliance? No     Patient demonstrates understanding of the following:  Reason for the appointment, diagnosis and treatment plan: Yes  Knowledge of proper use of medications and conditions for which they are ordered (with special attention to potential side effects or drug interactions): Yes  Which situations necessitate calling provider and whom to contact: Yes- discussed the stoplight tool to help assist with this.      Teaching Concerns Addressed:      Proper use of surgical scrub explain: Yes    Nutritional needs and diet plan: Yes  Pain management techniques: Yes  Wound Care: Yes  How and/when to access community resources: Yes  Need for pre-op with in 30 days: Yes  -I asked them to ensure they go over their daily medications during this visit and discuss what medications need to be stopped before surgery and when. If you are doing a pre-op with your PCP and they are not within the [facility], I ask them to fax it to our pre-op office. Patient verbalized understanding.      Instructional Materials Used/Given:  a bottle of chlorhexidine soap and a surgery packet given to patient in clinic.      - Important contact info/ phone numbers: emphasizing clinic number and after hours number  - Map/ location of surgery  - Showering instructions  - Stop light tool    Additionally the following was discussed with patient:  Patient verbalized understanding they need an adults drive and to have someone stay with them for 24 hours after surgery.         -Next step: Schedule  a surgery date and schedule a Pre-Op appointment with PCP or PAC.     Time spent with patient: 15 minutes.

## 2024-09-23 ENCOUNTER — HOSPITAL ENCOUNTER (OUTPATIENT)
Facility: AMBULATORY SURGERY CENTER | Age: 59
End: 2024-09-23
Attending: ORTHOPAEDIC SURGERY | Admitting: ORTHOPAEDIC SURGERY
Payer: COMMERCIAL

## 2024-09-23 ENCOUNTER — TELEPHONE (OUTPATIENT)
Dept: ORTHOPEDICS | Facility: CLINIC | Age: 59
End: 2024-09-23
Payer: COMMERCIAL

## 2024-09-23 DIAGNOSIS — B99.9 INFECTION: Primary | ICD-10-CM

## 2024-09-23 RX ORDER — CEFAZOLIN SODIUM/WATER 2 G/20 ML
2 SYRINGE (ML) INTRAVENOUS
OUTPATIENT
Start: 2024-09-23

## 2024-09-23 RX ORDER — CEFAZOLIN SODIUM/WATER 2 G/20 ML
2 SYRINGE (ML) INTRAVENOUS SEE ADMIN INSTRUCTIONS
OUTPATIENT
Start: 2024-09-23

## 2024-09-23 NOTE — TELEPHONE ENCOUNTER
Patient is scheduled for surgery with Dr. Silva     Spoke with: Reji     Date of Surgery: 10/21/24    Location: [facility]    Informed patient they will need an adult  Yes    Pre op with Provider PCP, patient will be scheduling with PCP/locally.     Additional imaging/appointments: PO Made    Surgery packet: Received     Additional comments: Declined earlier dates such as 10/10/24 and 10/17/24. Pt  requesting c/b from care team as he has a few medical questions. Will route to care team.         Kaitlin Bangura on 9/23/2024 at 9:52 AM

## 2024-09-23 NOTE — TELEPHONE ENCOUNTER
- A call was placed to the patient. Patient did not answer phone so a voicemail was left.     - Call back number to clinic was given and patient was told to call back and ask for Ese if they would like to discuss questions.

## 2024-09-24 NOTE — CONFIDENTIAL NOTE
Other: Patient would like a call back about scheduling his surgery sooner date. Please call patient back.     Could we send this information to you in [employer] or would you prefer to receive a phone call?:   Patient would prefer a phone call   Okay to leave a detailed message?: Yes at Cell number on file:    Telephone Information:   Mobile [phone]

## 2024-09-24 NOTE — TELEPHONE ENCOUNTER
- A call was placed to the patient.     - Patient gives platelets every 2 weeks.     - Planning to give platelets on 10/4 and 10/16. I said to cancel 10/16 and we set up PAC for 10/2 and asked him to get the final word form them as they will be the ones clearing him for surgery.     - Patient verbalized understanding of plan and all questions were answered. Call back number to clinic was given and patient was told to call if they had an further questions.

## 2024-09-25 ENCOUNTER — TELEPHONE (OUTPATIENT)
Dept: ORTHOPEDICS | Facility: CLINIC | Age: 59
End: 2024-09-25
Payer: COMMERCIAL

## 2024-09-25 NOTE — TELEPHONE ENCOUNTER
FUTURE VISIT INFORMATION      SURGERY INFORMATION:  Date: 10/21/24  [facility]  Surgeon:  Ronald Silva MD   Anesthesia Type:  general  Procedure: Debridement Left Posterior Hip Wound   Consult: ov 9/20/24    RECORDS REQUESTED FROM:       Primary Care Provider: Renny

## 2024-09-25 NOTE — TELEPHONE ENCOUNTER
Patient Returning Call    Reason for call:  patient calling regarding upcoming surgery date 10/10/24 would work better.  requesting callback     Information relayed to patient:  te sent to clinic     Patient has additional questions:  No      Okay to leave a detailed message?: Yes at Cell number on file:    Telephone Information:   Mobile [phone]

## 2024-09-26 ENCOUNTER — HOSPITAL ENCOUNTER (OUTPATIENT)
Facility: CLINIC | Age: 59
End: 2024-09-26
Attending: ORTHOPAEDIC SURGERY | Admitting: ORTHOPAEDIC SURGERY
Payer: COMMERCIAL

## 2024-09-26 PROBLEM — T81.49XA WOUND INFECTION AFTER SURGERY: Status: ACTIVE | Noted: 2024-09-20

## 2024-09-26 NOTE — TELEPHONE ENCOUNTER
Patient is rescheduled for surgery with Dr. Silva    Spoke with: Reji    Date of Surgery: 10/10/24    Location: [facility]    Informed patient they will need an adult  Yes    Pre op with Provider PAC, 10/2/24    Additional imaging/appointments: PO Rescheduled    Additional comments: Rescheduled from 10/21/24 to 10/10/24 per patient.         Kaitlin Bangura on 9/26/2024 at 8:59 AM

## 2024-10-02 ENCOUNTER — VIRTUAL VISIT (OUTPATIENT)
Dept: SURGERY | Facility: CLINIC | Age: 59
End: 2024-10-02
Payer: COMMERCIAL

## 2024-10-02 ENCOUNTER — PRE VISIT (OUTPATIENT)
Dept: SURGERY | Facility: CLINIC | Age: 59
End: 2024-10-02

## 2024-10-02 ENCOUNTER — ANESTHESIA EVENT (OUTPATIENT)
Dept: SURGERY | Facility: CLINIC | Age: 59
End: 2024-10-02
Payer: COMMERCIAL

## 2024-10-02 VITALS — BODY MASS INDEX: 23.32 KG/M2 | HEIGHT: 65 IN | WEIGHT: 140 LBS

## 2024-10-02 DIAGNOSIS — T81.49XA WOUND INFECTION AFTER SURGERY: ICD-10-CM

## 2024-10-02 DIAGNOSIS — Z01.818 PREOP EXAMINATION: Primary | ICD-10-CM

## 2024-10-02 PROCEDURE — 99202 OFFICE O/P NEW SF 15 MIN: CPT | Mod: 95 | Performed by: PHYSICIAN ASSISTANT

## 2024-10-02 ASSESSMENT — LIFESTYLE VARIABLES: TOBACCO_USE: 0

## 2024-10-02 ASSESSMENT — ENCOUNTER SYMPTOMS: SEIZURES: 0

## 2024-10-02 ASSESSMENT — PAIN SCALES - GENERAL: PAINLEVEL: NO PAIN (0)

## 2024-10-02 NOTE — PATIENT INSTRUCTIONS
Preparing for Your Surgery      Name:  Reji Solorzano   MRN:  [id number]   :  1965   Today's Date:  10/2/2024       Arriving for surgery:  Surgery date:  10/10/24  Arrival time:  11:30 am      Please come to:     [facility] [address] [address]     Parking is available in front of the Clinics and Surgery Center building from 5:30AM to 8:00PM.  -  Proceed to the 5th floor to check into the Ambulatory Surgery Center.              >> There will be patient concierges on the 1st and 5th floor, for assistance or an escort, if you would like.              >> Please call [phone] with any questions.    What can I eat or drink?  -  You may eat and drink normally up to 8 hours prior to arrival time.  -  You may have clear liquids until 2 hours prior to arrival time.     Examples of clear liquids:  Water  Clear broth  Juices (apple, white grape, white cranberry  and cider) without pulp  Noncarbonated, powder based beverages  (lemonade and Hany-Aid)  Sodas (Sprite, 7-Up, ginger ale and seltzer)  Coffee or tea (without milk or cream)  Gatorade    -  No Alcohol or cannabis products for at least 24 hours before surgery.     Which medicines can I take?    Hold Aspirin for 7 days before surgery.   Hold Multivitamins for 7 days before surgery.  Hold Supplements for 7 days before surgery.  Hold Ibuprofen (Advil, Motrin) for 1 day(s) before surgery--unless otherwise directed by surgeon.  Hold Naproxen (Aleve) for 4 days before surgery.    -  PLEASE TAKE these medications the day of surgery:  Tylenol if needed.    How do I prepare myself?  - Please take 2 showers (one the night prior to surgery and one the morning of surgery) using Scrubcare or Hibiclens soap.    Use this soap only from the neck to your toes.     Leave the soap on your skin for one minute--then rinse thoroughly.      You may use your own shampoo and conditioner. No other hair  products.   - Please remove all jewelry and body piercings.  - No lotions, deodorants or fragrance.  - No makeup or fingernail polish.   - Bring your ID and insurance card.    -If you use a CPAP machine, please bring the CPAP machine, tubing, and mask to hospital.    -If you have a Deep Brain Stimulator, Spinal Cord Stimulator, or any Neuro Stimulator device---you must bring the remote control to the hospital.      ALL PATIENTS GOING HOME THE SAME DAY OF SURGERY ARE REQUIRED TO HAVE A RESPONSIBLE ADULT TO DRIVE AND BE IN ATTENDANCE WITH THEM FOR 24 HOURS FOLLOWING SURGERY.    Covid testing policy as of 12/06/2022  Your surgeon will notify and schedule you for a COVID test if one is needed before surgery--please direct any questions or COVID symptoms to your surgeon      Questions or Concerns:    - For any questions regarding the day of surgery or your hospital stay, please contact the Pre Admission Nursing Office at [phone].       - If you have health changes between today and your surgery, please call your surgeon.       - For questions after surgery, please call your surgeons office.           Current Visitor Guidelines    You may have 2 visitors in the pre op area.    Visiting hours: 8 a.m. to 8:30 p.m.    Patients confirmed or suspected to have symptoms of COVID 19 or flu:     No visitors allowed for adult patients.   Children (under age 18) can have 1 named visitor.     People who are sick or showing symptoms of COVID 19 or flu:    Are not allowed to visit patients--we can only make exceptions in special situations.       Please follow these guidelines for your visit:          Please maintain social distance          Masking is optional--however at times you may be asked to wear a mask for the safety of yourself and others     Clean your hands with alcohol hand . Do this when you arrive at and leave the building and patient room,    And again after you touch your mask or anything in the room.      Go directly to and from the room you are visiting.     Stay in the patient s room during your visit. Limit going to other places in the hospital as much as possible     Leave bags and jackets at home or in the car.     For everyone s health, please don t come and go during your visit. That includes for smoking   during your visit.

## 2024-10-02 NOTE — H&P
Pre-Operative H & P     CC:  Preoperative exam to assess for increased cardiopulmonary risk while undergoing surgery and anesthesia.    Date of Encounter: 10/2/2024  Primary Care Physician:  No Ref-Primary, Physician     Reason for visit:   Encounter Diagnoses   Name Primary?    Wound infection after surgery Yes    Preop examination        HPI  Reji Solorzano is a 59 year old male who presents for pre-operative H & P in preparation for  Procedure Information       Case: [id number] Date/Time: 10/10/24 1300    Procedure: Debridement Left Posterior Hip Wound (Left: Leg)    Anesthesia type: General    Diagnosis: Wound infection after surgery [T81.49XA]    Pre-op diagnosis: Wound infection after surgery [T81.49XA]    Location: [place] [address] / [facility]    Providers: Ronald Silva MD            Mr. Solorzano has a history of B/L hamstring repairs at the ischial tuberosity. In August 2023, he had infections of both sides and has been debrided. On the right side, he has the suture anchors removed. In May 2024, the left wound opened up again and he had another debridement and a course of antibiotics. His left wound has opened up again. He denies pain at this site. On examination, at the inferior gluteal crease at the old scar, he has a small wound that is about 2 x 2 mm in diameter.  Clear fluid is able to be expressed from it.  It is not tender. This may indicate some infected foreign material or necrotic material that is persisting in the wound. He now presents for the above procedure.    PMH is otherwise unremarkable.       History is obtained from the patient and chart review    Hx of abnormal bleeding or anti-platelet use: denies      Past Medical History  Past Medical History:   Diagnosis Date    Bilateral carpal tunnel syndrome     Inguinal hernia     Rupture of right hamstring tendon     Tear of medial meniscus of left knee     Wound infection after surgery      Left posterior hip       Past Surgical History  Past Surgical History:   Procedure Laterality Date     Left knee arthroscopic partial medial meniscectomy  2018    CA ANESTH,SHOULDER REPLACEMENT Right 2021    CARPAL TUNNEL RELEASE RT/LT Bilateral     2001 & 2003    DENTAL SURGERY      wisdom teeth    REPAIR QUADRICEPS / HAMSTRING MUSCLE Left 2019    REPAIR QUADRICEPS / HAMSTRING MUSCLE Right 2020    Robotic-right inguinal hernia repair with mesh  2018    TONSILLECTOMY  1970       Prior to Admission Medications  No current outpatient medications on file.       Allergies  No Known Allergies    Social History  Social History     Socioeconomic History    Marital status:      Spouse name: Not on file    Number of children: Not on file    Years of education: Not on file    Highest education level: Not on file   Occupational History    Not on file   Tobacco Use    Smoking status: Never    Smokeless tobacco: Never   Substance and Sexual Activity    Alcohol use: Yes     Comment: 2 beers month    Drug use: Never    Sexual activity: Not on file   Other Topics Concern    Not on file   Social History Narrative    Not on file     Social Determinants of Health     Financial Resource Strain: Low Risk  (5/19/2023)    Received [facility], [facility] [place]    Financial Resource Strain     Difficulty of Paying Living Expenses: 3     Difficulty of Paying Living Expenses: Not on file   Food Insecurity: No Food Insecurity (5/19/2023)    Received from [facility], [facility]    Food Insecurity     Worried About Running Out of Food in the Last Year: 1   Transportation Needs: No Transportation Needs (5/19/2023)    Received from [facility], [facility]    Transportation Needs     Lack of Transportation (Medical): 1   Physical Activity:  Not on file   Stress: Not on file   Social Connections: Unknown (6/1/2024)    Received from [facility]    Social Connections     Frequency of Communication with Friends and Family: Not on file   Interpersonal Safety: Not on file   Housing Stability: Low Risk  (5/19/2023)    Received from [facility], [facility]    Housing Stability     Unable to Pay for Housing in the Last Year: 1       Family History  Family History   Problem Relation Age of Onset    Anesthesia Reaction No family hx of     Deep Vein Thrombosis (DVT) No family hx of        Review of Systems  The complete review of systems is negative other than noted in the HPI or here.   Anesthesia Evaluation   Pt has had prior anesthetic.     No history of anesthetic complications       ROS/MED HX  ENT/Pulmonary:  - neg pulmonary ROS  (-) tobacco use, asthma, sleep apnea and recent URI   Neurologic:  - neg neurologic ROS  (-) no seizures and no CVA   Cardiovascular:       METS/Exercise Tolerance: >4 METS Comment: Runs 5-8 miles daily or bicycles regularly   Hematologic:  - neg hematologic  ROS  (-) history of blood clots and history of blood transfusion   Musculoskeletal: Comment: S/p right shoulder arthroplasty    S/p B/L hamstring repairs at the ischial tuberosity. Hx infections of both sides, s/p debridments.     S/p B/L carpal tunnel release        GI/Hepatic:  - neg GI/hepatic ROS  (-) GERD and liver disease   Renal/Genitourinary:  - neg Renal ROS  (-) renal disease   Endo:  - neg endo ROS  (-) Type II DM   Psychiatric/Substance Use:  - neg psychiatric ROS     Infectious Disease:  - neg infectious disease ROS     Malignancy:  - neg malignancy ROS     Other:  - neg other ROS          Virtual visit -  No vitals were obtained    Physical Exam  Constitutional: Awake, alert, cooperative, no apparent distress, and appears stated age.  HENT:  "Normocephalic  Respiratory: non labored breathing   Neurologic: Awake, alert, oriented to name, place and time.   Neuropsychiatric: Calm, cooperative. Normal affect.      Prior Labs/Diagnostic Studies   All labs and imaging personally reviewed       The patient's records and results personally reviewed by this provider.     Labs ordered for DOS:  CBC, BMP    Assessment    Reji Solorzano is a 59 year old male seen as a PAC referral for risk assessment and optimization for anesthesia.    Plan/Recommendations  Pt will be optimized for the proposed procedure.  See below for details on the assessment, risk, and preoperative recommendations    NEUROLOGY  - No history of TIA, CVA or seizure    -Post Op delirium risk factors:  No risk identified    ENT  - No current airway concerns.  Will need to be reassessed day of surgery.  Mallampati: Unable to assess  TM: Unable to assess    CARDIAC  - No history of CAD, Hypertension, and Afib  - METS (Metabolic Equivalents)  Runs 5-8 miles daily or bicycles regularly.  Patient performs 4 or more METS exercise without symptoms             Total Score: 0      RCRI-Very low risk: Class 1 0.4% complication rate             Total Score: 0        PULMONARY  WES Low Risk             Total Score: 2    WES: Over 50 ys old    WES: Male      - Denies asthma or inhaler use  - Tobacco History    History   Smoking Status    Never   Smokeless Tobacco    Never       GI  - Denies GERD  PONV Low Risk  Total Score: 1           1 AN PONV: Patient is not a current smoker          ENDOCRINE    - BMI: Estimated body mass index is 23.66 kg/m  as calculated from the following:    Height as of this encounter: 1.638 m (5' 4.5\").    Weight as of this encounter: 63.5 kg (140 lb).  Healthy Weight (BMI 18.5-24.9)  - No history of Diabetes Mellitus    HEME  VTE Low Risk 0.5%             Total Score: 3    VTE: Greater than 59 yrs old    VTE: Male      - No history of abnormal bleeding or antiplatelet " use.      MSK  Patient is NOT Frail             Total Score: 0      - S/p right shoulder arthroplasty  - S/p B/L hamstring repairs at the ischial tuberosity. Hx infections of both sides, s/p debridments.   - S/p B/L carpal tunnel release        The patient is aware that the final anesthesia plan will be decided by the assigned anesthesia provider on the date of service.      The patient is optimized for their procedure. AVS with information on surgery time/arrival time, meds and NPO status given by nursing staff. No further diagnostic testing indicated.    Please refer to the physical examination documented by the anesthesiologist in the anesthesia record on the day of surgery.    Video-Visit Details    Type of service:  Video Visit    Provider received verbal consent for a Video Visit from the patient? Yes     Originating Location (pt. Location): Home    Distant Location (provider location):  Off-site  Mode of Communication:  Video Conference via Doximity (started visit w/ Joseline, but provider unable to hear patient. Switched to Doximity to complete visit.)    On the day of service:     Prep time: 11 minutes  Visit time: 8 minutes  Documentation time: 9 minutes  ------------------------------------------  Total time: 28 minutes      Giovanna Scott PA-C  Preoperative Assessment Center  [facility]  [facility]  Phone: [phone]  Fax: [phone]

## 2024-10-02 NOTE — PROGRESS NOTES
Reji is a 59 year old who is being evaluated via a billable video visit.    How would you like to obtain your AVS? Mail a copy  If the video visit is dropped, the invitation should be resent by: Text to cell phone: [phone]    Subjective   Reji is a 59 year old, presenting for the following health issues:  Pre-Op Exam    HPI         Physical Exam

## 2024-10-08 NOTE — TELEPHONE ENCOUNTER
Phoned patient to inform him that unfortunately we are canceling his surgery due to the nation wide medical shortage and at this time we are not able to schedule anytime soon. However, once the green light is give, call will be made to patient to get him reschedule. Patient was provided direct call back number of [phone] and [phone].     Patient had no further questions or concerns.

## 2024-10-10 ENCOUNTER — ANESTHESIA (OUTPATIENT)
Dept: SURGERY | Facility: CLINIC | Age: 59
End: 2024-10-10
Payer: COMMERCIAL

## 2024-10-11 NOTE — TELEPHONE ENCOUNTER
Patient is scheduled for surgery with Dr. Silva     Spoke with: Tee    Date of Surgery: 10/24/24    Location: [facility] OR    Informed patient they will need an adult  Yes    Pre op with Provider PAC completed    Additional imaging/appointments: PO Rescheduled     Additional comments: Added to waiting list, cx list.         Kaitlin Bangura on 10/11/2024 at 1:08 PM

## 2024-10-24 ENCOUNTER — HOSPITAL ENCOUNTER (OUTPATIENT)
Facility: CLINIC | Age: 59
Discharge: HOME | End: 2024-10-24
Attending: ORTHOPAEDIC SURGERY | Admitting: ORTHOPAEDIC SURGERY
Payer: COMMERCIAL

## 2024-10-24 VITALS
HEIGHT: 64 IN | DIASTOLIC BLOOD PRESSURE: 81 MMHG | OXYGEN SATURATION: 98 % | RESPIRATION RATE: 15 BRPM | SYSTOLIC BLOOD PRESSURE: 135 MMHG | BODY MASS INDEX: 23.9 KG/M2 | HEART RATE: 65 BPM | WEIGHT: 139.99 LBS | TEMPERATURE: 97 F

## 2024-10-24 DIAGNOSIS — B99.9 INFECTION: ICD-10-CM

## 2024-10-24 DIAGNOSIS — T81.49XA WOUND INFECTION AFTER SURGERY: Primary | ICD-10-CM

## 2024-10-24 LAB
ANION GAP SERPL CALCULATED.3IONS-SCNC: 9 MMOL/L (ref 7–15)
BUN SERPL-MCNC: 22.5 MG/DL (ref 8–23)
CALCIUM SERPL-MCNC: 9.3 MG/DL (ref 8.8–10.4)
CHLORIDE SERPL-SCNC: 103 MMOL/L (ref 98–107)
CREAT SERPL-MCNC: 0.64 MG/DL (ref 0.67–1.17)
EGFRCR SERPLBLD CKD-EPI 2021: >90 ML/MIN/1.73M2
ERYTHROCYTE [DISTWIDTH] IN BLOOD BY AUTOMATED COUNT: 13.9 % (ref 10–15)
GLUCOSE SERPL-MCNC: 94 MG/DL (ref 70–99)
HCO3 SERPL-SCNC: 28 MMOL/L (ref 22–29)
HCT VFR BLD AUTO: 37.4 % (ref 40–53)
HGB BLD-MCNC: 12.3 G/DL (ref 13.3–17.7)
MCH RBC QN AUTO: 29.2 PG (ref 26.5–33)
MCHC RBC AUTO-ENTMCNC: 32.9 G/DL (ref 31.5–36.5)
MCV RBC AUTO: 89 FL (ref 78–100)
PLATELET # BLD AUTO: 253 10E3/UL (ref 150–450)
POTASSIUM SERPL-SCNC: 4.4 MMOL/L (ref 3.4–5.3)
RBC # BLD AUTO: 4.21 10E6/UL (ref 4.4–5.9)
SODIUM SERPL-SCNC: 140 MMOL/L (ref 135–145)
WBC # BLD AUTO: 6.2 10E3/UL (ref 4–11)

## 2024-10-24 PROCEDURE — 250N000009 HC RX 250: Performed by: NURSE ANESTHETIST, CERTIFIED REGISTERED

## 2024-10-24 PROCEDURE — 250N000025 HC SEVOFLURANE, PER MIN: Performed by: ORTHOPAEDIC SURGERY

## 2024-10-24 PROCEDURE — 258N000003 HC RX IP 258 OP 636: Performed by: NURSE ANESTHETIST, CERTIFIED REGISTERED

## 2024-10-24 PROCEDURE — 250N000011 HC RX IP 250 OP 636: Performed by: NURSE ANESTHETIST, CERTIFIED REGISTERED

## 2024-10-24 PROCEDURE — 250N000011 HC RX IP 250 OP 636: Performed by: PHYSICIAN ASSISTANT

## 2024-10-24 PROCEDURE — 29862 HIP ARTHR0 W/DEBRIDEMENT: CPT | Performed by: NURSE ANESTHETIST, CERTIFIED REGISTERED

## 2024-10-24 PROCEDURE — 258N000001 HC RX 258: Performed by: ORTHOPAEDIC SURGERY

## 2024-10-24 PROCEDURE — 87077 CULTURE AEROBIC IDENTIFY: CPT | Performed by: ORTHOPAEDIC SURGERY

## 2024-10-24 PROCEDURE — 87075 CULTR BACTERIA EXCEPT BLOOD: CPT | Performed by: ORTHOPAEDIC SURGERY

## 2024-10-24 PROCEDURE — 258N000003 HC RX IP 258 OP 636: Performed by: STUDENT IN AN ORGANIZED HEALTH CARE EDUCATION/TRAINING PROGRAM

## 2024-10-24 PROCEDURE — 250N000013 HC RX MED GY IP 250 OP 250 PS 637: Performed by: STUDENT IN AN ORGANIZED HEALTH CARE EDUCATION/TRAINING PROGRAM

## 2024-10-24 PROCEDURE — 360N000076 HC SURGERY LEVEL 3, PER MIN: Performed by: ORTHOPAEDIC SURGERY

## 2024-10-24 PROCEDURE — 10180 I&D COMPLEX PO WOUND INFCTJ: CPT | Performed by: ORTHOPAEDIC SURGERY

## 2024-10-24 PROCEDURE — 85027 COMPLETE CBC AUTOMATED: CPT | Performed by: PHYSICIAN ASSISTANT

## 2024-10-24 PROCEDURE — 710N000012 HC RECOVERY PHASE 2, PER MINUTE: Performed by: ORTHOPAEDIC SURGERY

## 2024-10-24 PROCEDURE — 999N000141 HC STATISTIC PRE-PROCEDURE NURSING ASSESSMENT: Performed by: ORTHOPAEDIC SURGERY

## 2024-10-24 PROCEDURE — 250N000011 HC RX IP 250 OP 636: Performed by: ORTHOPAEDIC SURGERY

## 2024-10-24 PROCEDURE — 370N000017 HC ANESTHESIA TECHNICAL FEE, PER MIN: Performed by: ORTHOPAEDIC SURGERY

## 2024-10-24 PROCEDURE — 710N000010 HC RECOVERY PHASE 1, LEVEL 2, PER MIN: Performed by: ORTHOPAEDIC SURGERY

## 2024-10-24 PROCEDURE — 29862 HIP ARTHR0 W/DEBRIDEMENT: CPT | Performed by: STUDENT IN AN ORGANIZED HEALTH CARE EDUCATION/TRAINING PROGRAM

## 2024-10-24 PROCEDURE — 80048 BASIC METABOLIC PNL TOTAL CA: CPT | Performed by: ANESTHESIOLOGY

## 2024-10-24 PROCEDURE — 272N000001 HC OR GENERAL SUPPLY STERILE: Performed by: ORTHOPAEDIC SURGERY

## 2024-10-24 RX ORDER — SODIUM CHLORIDE, SODIUM LACTATE, POTASSIUM CHLORIDE, CALCIUM CHLORIDE 600; 310; 30; 20 MG/100ML; MG/100ML; MG/100ML; MG/100ML
INJECTION, SOLUTION INTRAVENOUS CONTINUOUS
Status: DISCONTINUED | OUTPATIENT
Start: 2024-10-24 | End: 2024-10-24 | Stop reason: HOSPADM

## 2024-10-24 RX ORDER — MAGNESIUM SULFATE HEPTAHYDRATE 40 MG/ML
2 INJECTION, SOLUTION INTRAVENOUS ONCE
Status: DISCONTINUED | OUTPATIENT
Start: 2024-10-24 | End: 2024-10-24 | Stop reason: HOSPADM

## 2024-10-24 RX ORDER — PROPOFOL 10 MG/ML
INJECTION, EMULSION INTRAVENOUS PRN
Status: DISCONTINUED | OUTPATIENT
Start: 2024-10-24 | End: 2024-10-24

## 2024-10-24 RX ORDER — CEFAZOLIN SODIUM/WATER 2 G/20 ML
2 SYRINGE (ML) INTRAVENOUS SEE ADMIN INSTRUCTIONS
Status: DISCONTINUED | OUTPATIENT
Start: 2024-10-24 | End: 2024-10-24 | Stop reason: HOSPADM

## 2024-10-24 RX ORDER — ACETAMINOPHEN 325 MG/1
975 TABLET ORAL ONCE
Status: DISCONTINUED | OUTPATIENT
Start: 2024-10-24 | End: 2024-10-24 | Stop reason: HOSPADM

## 2024-10-24 RX ORDER — AMOXICILLIN 250 MG
1-2 CAPSULE ORAL 2 TIMES DAILY
Qty: 30 TABLET | Refills: 0 | Status: SHIPPED | OUTPATIENT
Start: 2024-10-24

## 2024-10-24 RX ORDER — HALOPERIDOL 5 MG/ML
1 INJECTION INTRAMUSCULAR
Status: DISCONTINUED | OUTPATIENT
Start: 2024-10-24 | End: 2024-10-24 | Stop reason: HOSPADM

## 2024-10-24 RX ORDER — HYDROMORPHONE HYDROCHLORIDE 1 MG/ML
0.4 INJECTION, SOLUTION INTRAMUSCULAR; INTRAVENOUS; SUBCUTANEOUS EVERY 5 MIN PRN
Status: DISCONTINUED | OUTPATIENT
Start: 2024-10-24 | End: 2024-10-24 | Stop reason: HOSPADM

## 2024-10-24 RX ORDER — FENTANYL CITRATE 50 UG/ML
25 INJECTION, SOLUTION INTRAMUSCULAR; INTRAVENOUS EVERY 5 MIN PRN
Status: DISCONTINUED | OUTPATIENT
Start: 2024-10-24 | End: 2024-10-24 | Stop reason: HOSPADM

## 2024-10-24 RX ORDER — OXYCODONE HYDROCHLORIDE 5 MG/1
5 TABLET ORAL EVERY 6 HOURS PRN
Qty: 16 TABLET | Refills: 0 | Status: SHIPPED | OUTPATIENT
Start: 2024-10-24 | End: 2024-10-28

## 2024-10-24 RX ORDER — HYDROXYZINE HYDROCHLORIDE 25 MG/1
25 TABLET, FILM COATED ORAL
Status: DISCONTINUED | OUTPATIENT
Start: 2024-10-24 | End: 2024-10-24 | Stop reason: HOSPADM

## 2024-10-24 RX ORDER — OXYCODONE HYDROCHLORIDE 5 MG/1
5 TABLET ORAL
Status: DISCONTINUED | OUTPATIENT
Start: 2024-10-24 | End: 2024-10-24 | Stop reason: HOSPADM

## 2024-10-24 RX ORDER — LIDOCAINE HYDROCHLORIDE 20 MG/ML
INJECTION, SOLUTION INFILTRATION; PERINEURAL PRN
Status: DISCONTINUED | OUTPATIENT
Start: 2024-10-24 | End: 2024-10-24

## 2024-10-24 RX ORDER — ONDANSETRON 2 MG/ML
4 INJECTION INTRAMUSCULAR; INTRAVENOUS EVERY 30 MIN PRN
Status: DISCONTINUED | OUTPATIENT
Start: 2024-10-24 | End: 2024-10-24 | Stop reason: HOSPADM

## 2024-10-24 RX ORDER — ACETAMINOPHEN 325 MG/1
650 TABLET ORAL EVERY 6 HOURS PRN
Qty: 50 TABLET | Refills: 0 | Status: SHIPPED | OUTPATIENT
Start: 2024-10-24

## 2024-10-24 RX ORDER — ONDANSETRON 4 MG/1
4 TABLET, ORALLY DISINTEGRATING ORAL EVERY 30 MIN PRN
Status: DISCONTINUED | OUTPATIENT
Start: 2024-10-24 | End: 2024-10-24 | Stop reason: HOSPADM

## 2024-10-24 RX ORDER — ONDANSETRON 2 MG/ML
INJECTION INTRAMUSCULAR; INTRAVENOUS PRN
Status: DISCONTINUED | OUTPATIENT
Start: 2024-10-24 | End: 2024-10-24

## 2024-10-24 RX ORDER — LIDOCAINE 40 MG/G
CREAM TOPICAL
Status: DISCONTINUED | OUTPATIENT
Start: 2024-10-24 | End: 2024-10-24 | Stop reason: HOSPADM

## 2024-10-24 RX ORDER — CEFAZOLIN SODIUM/WATER 2 G/20 ML
2 SYRINGE (ML) INTRAVENOUS
Status: COMPLETED | OUTPATIENT
Start: 2024-10-24 | End: 2024-10-24

## 2024-10-24 RX ORDER — HYDRALAZINE HYDROCHLORIDE 20 MG/ML
2.5-5 INJECTION INTRAMUSCULAR; INTRAVENOUS EVERY 10 MIN PRN
Status: DISCONTINUED | OUTPATIENT
Start: 2024-10-24 | End: 2024-10-24 | Stop reason: HOSPADM

## 2024-10-24 RX ORDER — GABAPENTIN 100 MG/1
300 CAPSULE ORAL
Status: DISCONTINUED | OUTPATIENT
Start: 2024-10-24 | End: 2024-10-24 | Stop reason: HOSPADM

## 2024-10-24 RX ORDER — SODIUM CHLORIDE, SODIUM LACTATE, POTASSIUM CHLORIDE, CALCIUM CHLORIDE 600; 310; 30; 20 MG/100ML; MG/100ML; MG/100ML; MG/100ML
INJECTION, SOLUTION INTRAVENOUS CONTINUOUS PRN
Status: DISCONTINUED | OUTPATIENT
Start: 2024-10-24 | End: 2024-10-24

## 2024-10-24 RX ORDER — DEXAMETHASONE SODIUM PHOSPHATE 4 MG/ML
INJECTION, SOLUTION INTRA-ARTICULAR; INTRALESIONAL; INTRAMUSCULAR; INTRAVENOUS; SOFT TISSUE PRN
Status: DISCONTINUED | OUTPATIENT
Start: 2024-10-24 | End: 2024-10-24

## 2024-10-24 RX ORDER — NALOXONE HYDROCHLORIDE 0.4 MG/ML
0.1 INJECTION, SOLUTION INTRAMUSCULAR; INTRAVENOUS; SUBCUTANEOUS
Status: DISCONTINUED | OUTPATIENT
Start: 2024-10-24 | End: 2024-10-24 | Stop reason: HOSPADM

## 2024-10-24 RX ORDER — BUPIVACAINE HYDROCHLORIDE 2.5 MG/ML
INJECTION, SOLUTION INFILTRATION; PERINEURAL PRN
Status: DISCONTINUED | OUTPATIENT
Start: 2024-10-24 | End: 2024-10-24 | Stop reason: HOSPADM

## 2024-10-24 RX ORDER — FENTANYL CITRATE 50 UG/ML
50 INJECTION, SOLUTION INTRAMUSCULAR; INTRAVENOUS EVERY 5 MIN PRN
Status: DISCONTINUED | OUTPATIENT
Start: 2024-10-24 | End: 2024-10-24 | Stop reason: HOSPADM

## 2024-10-24 RX ORDER — FENTANYL CITRATE 50 UG/ML
INJECTION, SOLUTION INTRAMUSCULAR; INTRAVENOUS PRN
Status: DISCONTINUED | OUTPATIENT
Start: 2024-10-24 | End: 2024-10-24

## 2024-10-24 RX ORDER — KETAMINE HYDROCHLORIDE 10 MG/ML
INJECTION INTRAMUSCULAR; INTRAVENOUS PRN
Status: DISCONTINUED | OUTPATIENT
Start: 2024-10-24 | End: 2024-10-24

## 2024-10-24 RX ORDER — ONDANSETRON 4 MG/1
4 TABLET, ORALLY DISINTEGRATING ORAL EVERY 8 HOURS PRN
Qty: 4 TABLET | Refills: 0 | Status: SHIPPED | OUTPATIENT
Start: 2024-10-24

## 2024-10-24 RX ORDER — ONDANSETRON 4 MG/1
4 TABLET, ORALLY DISINTEGRATING ORAL
Status: DISCONTINUED | OUTPATIENT
Start: 2024-10-24 | End: 2024-10-24 | Stop reason: HOSPADM

## 2024-10-24 RX ORDER — HYDROMORPHONE HYDROCHLORIDE 1 MG/ML
0.2 INJECTION, SOLUTION INTRAMUSCULAR; INTRAVENOUS; SUBCUTANEOUS EVERY 5 MIN PRN
Status: DISCONTINUED | OUTPATIENT
Start: 2024-10-24 | End: 2024-10-24 | Stop reason: HOSPADM

## 2024-10-24 RX ORDER — ACETAMINOPHEN 325 MG/1
975 TABLET ORAL ONCE
Status: COMPLETED | OUTPATIENT
Start: 2024-10-24 | End: 2024-10-24

## 2024-10-24 RX ORDER — DEXAMETHASONE SODIUM PHOSPHATE 4 MG/ML
4 INJECTION, SOLUTION INTRA-ARTICULAR; INTRALESIONAL; INTRAMUSCULAR; INTRAVENOUS; SOFT TISSUE
Status: DISCONTINUED | OUTPATIENT
Start: 2024-10-24 | End: 2024-10-24 | Stop reason: HOSPADM

## 2024-10-24 RX ORDER — LABETALOL HYDROCHLORIDE 5 MG/ML
10 INJECTION, SOLUTION INTRAVENOUS
Status: DISCONTINUED | OUTPATIENT
Start: 2024-10-24 | End: 2024-10-24 | Stop reason: HOSPADM

## 2024-10-24 RX ADMIN — ONDANSETRON 4 MG: 2 INJECTION INTRAMUSCULAR; INTRAVENOUS at 16:53

## 2024-10-24 RX ADMIN — PHENYLEPHRINE HYDROCHLORIDE 100 MCG: 10 INJECTION INTRAVENOUS at 16:52

## 2024-10-24 RX ADMIN — Medication 30 MG: at 16:05

## 2024-10-24 RX ADMIN — Medication 2 G: at 16:39

## 2024-10-24 RX ADMIN — MIDAZOLAM 2 MG: 1 INJECTION INTRAMUSCULAR; INTRAVENOUS at 16:05

## 2024-10-24 RX ADMIN — ACETAMINOPHEN 975 MG: 325 TABLET, FILM COATED ORAL at 15:46

## 2024-10-24 RX ADMIN — LIDOCAINE HYDROCHLORIDE 100 MG: 20 INJECTION, SOLUTION INFILTRATION; PERINEURAL at 16:05

## 2024-10-24 RX ADMIN — PHENYLEPHRINE HYDROCHLORIDE 100 MCG: 10 INJECTION INTRAVENOUS at 16:58

## 2024-10-24 RX ADMIN — DEXAMETHASONE SODIUM PHOSPHATE 10 MG: 4 INJECTION, SOLUTION INTRAMUSCULAR; INTRAVENOUS at 16:17

## 2024-10-24 RX ADMIN — PROPOFOL 150 MG: 10 INJECTION, EMULSION INTRAVENOUS at 16:05

## 2024-10-24 RX ADMIN — FENTANYL CITRATE 100 MCG: 50 INJECTION INTRAMUSCULAR; INTRAVENOUS at 16:05

## 2024-10-24 RX ADMIN — SODIUM CHLORIDE, POTASSIUM CHLORIDE, SODIUM LACTATE AND CALCIUM CHLORIDE: 600; 310; 30; 20 INJECTION, SOLUTION INTRAVENOUS at 15:53

## 2024-10-24 RX ADMIN — SUGAMMADEX 130 MG: 100 INJECTION, SOLUTION INTRAVENOUS at 17:05

## 2024-10-24 RX ADMIN — SODIUM CHLORIDE, POTASSIUM CHLORIDE, SODIUM LACTATE AND CALCIUM CHLORIDE: 600; 310; 30; 20 INJECTION, SOLUTION INTRAVENOUS at 15:46

## 2024-10-24 RX ADMIN — PHENYLEPHRINE HYDROCHLORIDE 100 MCG: 10 INJECTION INTRAVENOUS at 16:11

## 2024-10-24 RX ADMIN — Medication 50 MG: at 16:11

## 2024-10-24 RX ADMIN — Medication 20 MG: at 16:34

## 2024-10-24 ASSESSMENT — ACTIVITIES OF DAILY LIVING (ADL)
ADLS_ACUITY_SCORE: 0

## 2024-10-24 NOTE — ANESTHESIA CARE TRANSFER NOTE
Patient: Reji Solorzano    Procedure: Procedure(s):  Debridement and Removal Forgein Body Left Posterior Hip Wound       Diagnosis: Wound infection after surgery [T81.49XA]  Diagnosis Additional Information: No value filed.    Anesthesia Type:   No value filed.     Note:    Oropharynx: oropharynx clear of all foreign objects and spontaneously breathing  Level of Consciousness: awake  Oxygen Supplementation: face mask  Level of Supplemental Oxygen (L/min / FiO2): 6  Independent Airway: airway patency satisfactory and stable  Dentition: dentition unchanged  Vital Signs Stable: post-procedure vital signs reviewed and stable  Report to RN Given: handoff report given  Patient transferred to: PACU    Handoff Report: Identifed the Patient, Identified the Reponsible Provider, Reviewed the pertinent medical history, Discussed the surgical course, Reviewed Intra-OP anesthesia mangement and issues during anesthesia, Set expectations for post-procedure period and Allowed opportunity for questions and acknowledgement of understanding      Vitals:  Vitals Value Taken Time   BP     Temp     Pulse 66 10/24/24 1727   Resp 13 10/24/24 1727   SpO2 99 % 10/24/24 1727   Vitals shown include unfiled device data.    Electronically Signed By: CY Poon CRNA  October 24, 2024  5:28 PM

## 2024-10-24 NOTE — ANESTHESIA PROCEDURE NOTES
Airway       Patient location during procedure: OR       Procedure Start/Stop Times: 10/24/2024 4:15 PM  Staff -        Anesthesiologist:  Tsering Miguel MD       CRNA: Patricia Jean APRN CRNA       Performed By: anesthesiologist  Consent for Airway        Urgency: elective  Indications and Patient Condition       Indications for airway management: fariba-procedural       Induction type:intravenous       Mask difficulty assessment: 2 - vent by mask + OA or adjuvant +/- NMBA (OA, two provider until relaxant worked)    Final Airway Details       Final airway type: endotracheal airway       Successful airway: ETT - single and Oral  Endotracheal Airway Details        ETT size (mm): 7.0       Cuffed: yes       Cuff volume (mL): 8       Successful intubation technique: direct laryngoscopy       DL Blade Type: MAC 3       Grade View of Cords: 2       Adjucts: stylet       Position: Right       Measured from: gums/teeth       Secured at (cm): 22       Bite block used: Soft    Post intubation assessment        Placement verified by: capnometry, equal breath sounds and chest rise        Number of attempts at approach: 2       Secured with: tape       Ease of procedure: easy       Dentition: Intact and Unchanged    Medication(s) Administered   Medication Administration Time: 10/24/2024 4:15 PM    Additional Comments       CRNA DL with hutton 2, obtained GI view w/cricoid, unable to pass tube; Anesthesiologist used Mac 3 w/ Grade II view obtained

## 2024-10-24 NOTE — ANESTHESIA PREPROCEDURE EVALUATION
Anesthesia Pre-Procedure Evaluation    Patient: Reji Solorzano   MRN: [id number] : 1965        Procedure : Procedure(s):  Debridement Left Posterior Hip Wound          Past Medical History:   Diagnosis Date    Bilateral carpal tunnel syndrome     Inguinal hernia     Rupture of right hamstring tendon     Tear of medial meniscus of left knee     Wound infection after surgery     Left posterior hip      Past Surgical History:   Procedure Laterality Date     Left knee arthroscopic partial medial meniscectomy  2018    CA ANESTH,SHOULDER REPLACEMENT Right     CARPAL TUNNEL RELEASE RT/LT Bilateral      &     DENTAL SURGERY      wisdom teeth    REPAIR QUADRICEPS / HAMSTRING MUSCLE Left 2019    REPAIR QUADRICEPS / HAMSTRING MUSCLE Right     Robotic-right inguinal hernia repair with mesh  2018    TONSILLECTOMY  1970      No Known Allergies   Social History     Tobacco Use    Smoking status: Never    Smokeless tobacco: Never   Substance Use Topics    Alcohol use: Yes     Comment: 2 beers month      Wt Readings from Last 1 Encounters:   10/02/24 63.5 kg (140 lb)        Anesthesia Evaluation   Pt has had prior anesthetic. Type: General.    No history of anesthetic complications       ROS/MED HX  ENT/Pulmonary:       Neurologic:       Cardiovascular:  - neg cardiovascular ROS     METS/Exercise Tolerance: >4 METS Comment: Runs & miles daily, and 20+ miles on weekends.    Hematologic:       Musculoskeletal: Comment: S/p knee arthroscopy + partial medial meniscectomy  S/p carpal tunnel release  S/p shoulder surgery  S/p quadriceps/hamstring repair (Left) complicated by surgical site infection  -- Now scheduled for I&D +/- removal foreign body on 10/25/2024      GI/Hepatic:  - neg GI/hepatic ROS     Renal/Genitourinary:  - neg Renal ROS     Endo:  - neg endo ROS     Psychiatric/Substance Use:       Infectious Disease:  - neg infectious disease ROS     Malignancy:       Other:            Physical  "Exam    Airway  airway exam normal      Mallampati: I   TM distance: > 3 FB   Neck ROM: full   Mouth opening: > 3 cm    Respiratory Devices and Support         Dental       (+) Minor Abnormalities - some fillings, tiny chips      Cardiovascular   cardiovascular exam normal          Pulmonary   pulmonary exam normal                OUTSIDE LABS:  CBC:   Lab Results   Component Value Date    WBC 8.6 10/16/2023    WBC 6.8 10/10/2023    HGB 12.0 (L) 10/16/2023    HGB 12.7 (L) 10/10/2023    HCT 36.4 (L) 10/16/2023    HCT 38.3 (L) 10/10/2023     10/16/2023     10/10/2023     BMP:   Lab Results   Component Value Date    CR 0.68 10/16/2023    CR 0.60 (L) 10/10/2023     COAGS: No results found for: \"PTT\", \"INR\", \"FIBR\"  POC: No results found for: \"BGM\", \"HCG\", \"HCGS\"  HEPATIC:   Lab Results   Component Value Date    AST 32 10/16/2023     OTHER:   Lab Results   Component Value Date    SED 7 10/16/2023       Anesthesia Plan    ASA Status:  1    NPO Status:  NPO Appropriate    Anesthesia Type: General.     - Airway: ETT   Induction: Intravenous.   Maintenance: Balanced.        Consents    Anesthesia Plan(s) and associated risks, benefits, and realistic alternatives discussed. Questions answered and patient/representative(s) expressed understanding.     - Discussed:     - Discussed with:  Patient      - Extended Intubation/Ventilatory Support Discussed: No.      - Patient is DNR/DNI Status: No     Use of blood products discussed: No .     Postoperative Care    Pain management: Oral pain medications, IV analgesics, Multi-modal analgesia.   PONV prophylaxis: Ondansetron (or other 5HT-3), Dexamethasone or Solumedrol     Comments:               Deanna Frederick MD    I have reviewed the pertinent notes and labs in the chart from the past 30 days and (re)examined the patient.  Any updates or changes from those notes are reflected in this note.                                 "

## 2024-10-25 NOTE — ANESTHESIA POSTPROCEDURE EVALUATION
Patient: Reji Solorzano    Procedure: Procedure(s):  Debridement and Removal Forgein Body Left Posterior Hip Wound       Anesthesia Type:  No value filed.    Note:  Disposition: Outpatient   Postop Pain Control: Uneventful            Sign Out: Well controlled pain   PONV: No   Neuro/Psych: Uneventful            Sign Out: Acceptable/Baseline neuro status   Airway/Respiratory: Uneventful            Sign Out: Acceptable/Baseline resp. status   CV/Hemodynamics: Uneventful            Sign Out: Acceptable CV status; No obvious hypovolemia; No obvious fluid overload   Other NRE:    DID A NON-ROUTINE EVENT OCCUR?     Event details/Postop Comments:  Doing well. Alert, oriented. No sore throat, nausea, or problems with pain control.  Patient denies any concerns.                Last vitals:  Vitals Value Taken Time   /81 10/24/24 1745   Temp     Pulse 65 10/24/24 1745   Resp 15 10/24/24 1745   SpO2 100 % 10/24/24 1745       Electronically Signed By: Adrienne Valentin MD  October 24, 2024  10:20 PM

## 2024-10-25 NOTE — OP NOTE
DATE OF SURGERY: 10/25/2024    PREOPERATIVE DIAGNOSIS: Left posterior hip infection    POSTOPERATIVE DIAGNOSIS: Left posterior hip infection with retained foreign body    PROCEDURE: #1 debridement of skin to muscle left hip wound, #2 removal of foreign body left hip    SURGEON: Ronald Silva MD     ASSISTANT:  APOLINAR as an assistant was required to help retract and close the wound, and resident help was not available.    PATIENT HISTORY: This patient has a history of hamstring repairs on his right side.  He has had infections here debridements before but he has a persistent area of purulence that is coming from the wound.  He presents now for debridement understand the risks of persistent infection pain numbness tingling stiffness and weakness.    DESCRIPTION OF PROCEDURE: The patient 1 successful induction of anesthesia.  He was positioned in a prone position and the left posterior hip and thigh area were washed and sterilely prepped and draped.  I excised an ellipse of skin around the open wound and removed subcutaneous tissues.  Patient had some friable granulation tissue tracking down to the hamstring muscle.  I sent some of this for aerobic and anaerobic cultures and continue to debride this tract down to the hamstring.  I was hoping to find some object from his previous repair that might be responsible for his persistent infection.  We found to sections of nonabsorbable suture they are each about 6 inches in length.  These were removed.  I further explored the wound and removed all the abnormal granulation tissue.  I used a rongeur to excise this tissue.  We then copiously irrigated with about 2 L of saline.  I closed with 2-0 PDS in layers in the subcutaneous tissue followed by Monocryl and the skin and Dermabond.  A sterile bandage was applied and the patient was turned supine extubated and taken to the recovery room in stable condition.  He has been a blood loss is less than 10 mL.  There were no  complications.  I was present for the entire procedure.    Ronald Silva MD

## 2024-10-29 LAB — BACTERIA TISS BX CULT: ABNORMAL

## 2024-10-31 LAB — BACTERIA TISS BX CULT: NORMAL

## 2024-11-08 ENCOUNTER — OFFICE VISIT (OUTPATIENT)
Dept: ORTHOPEDICS | Facility: CLINIC | Age: 59
End: 2024-11-08
Payer: COMMERCIAL

## 2024-11-08 DIAGNOSIS — T81.49XA WOUND INFECTION AFTER SURGERY: Primary | ICD-10-CM

## 2024-11-08 PROCEDURE — 99213 OFFICE O/P EST LOW 20 MIN: CPT | Performed by: ORTHOPAEDIC SURGERY

## 2024-11-08 NOTE — PROGRESS NOTES
Orthopedic follow-up note    HPI  This patient with a history of bilateral hamstring repairs at the ischial tuberosity complicated by bilateral infections s/p debridements in the past with removal of suture anchors.  Most recently, he underwent a left sided irrigation and debridement on 10/24/2024 with removal of foreign body.  He presents today for wound check.    He states he is doing quite well for the last 11 days, however over the last 3 days, he has noticed a prominence at the surgical sites and pain when he sits.    He states that he has began to return to his desired level of activity slowly over the last week.  He is an avid runner and states that he is running approximately 6 miles per day at this point.    On examination, the patient is alert, oriented, and not in acute distress.  The wound is well-healed with no evidence of erythema or drainage.  There is a tenderness over the wound site with firmness of the tissue consistent with scar formation.  No fluctuance    Assessment and plan:  59-year-old male approximately 2 weeks s/p irrigation and debridement of his left surgical site.    We discussed that the prominence that he feels at the surgical site is consistent with scar formation.  We also discussed resumption of his normal normal level of activity slowly over time.  He can progress as his body tolerates as needed    Follow-up as needed      --  Jimbo Avalos MD  Orthopedic Surgery PGY-4

## 2024-11-08 NOTE — NURSING NOTE
Reason For Visit:   Chief Complaint   Patient presents with    RECHECK     DOS: 10/24/2024 - Debridement and Removal Forgein Body Left Posterior Hip Wound       There were no vitals taken for this visit.    Pain Assessment  Patient Currently in Pain: Yes  Patient's Stated Pain Goal: 3  0-10 Pain Scale: 3  Primary Pain Location: Hip    Faustino Buckley CMA

## 2024-11-08 NOTE — LETTER
11/8/2024      Reji Solorzano  [address] [facility] [address]      Dear Colleague,    Thank you for referring your patient, Reji Solorzano, to the [facility] [place]. Please see a copy of my visit note below.    I was present with the resident during the history and exam.  I discussed the case with the resident and agree with the findings as documented in the assessment and plan.    Orthopedic follow-up note    HPI  This patient with a history of bilateral hamstring repairs at the ischial tuberosity complicated by bilateral infections s/p debridements in the past with removal of suture anchors.  Most recently, he underwent a left sided irrigation and debridement on 10/24/2024 with removal of foreign body.  He presents today for wound check.    He states he is doing quite well for the last 11 days, however over the last 3 days, he has noticed a prominence at the surgical sites and pain when he sits.    He states that he has began to return to his desired level of activity slowly over the last week.  He is an avid runner and states that he is running approximately 6 miles per day at this point.    On examination, the patient is alert, oriented, and not in acute distress.  The wound is well-healed with no evidence of erythema or drainage.  There is a tenderness over the wound site with firmness of the tissue consistent with scar formation.  No fluctuance    Assessment and plan:  59-year-old male approximately 2 weeks s/p irrigation and debridement of his left surgical site.    We discussed that the prominence that he feels at the surgical site is consistent with scar formation.  We also discussed resumption of his normal normal level of activity slowly over time.  He can progress as his body tolerates as needed    Follow-up as needed      --  Jimbo Avalos MD  Orthopedic Surgery PGY-4       Again, thank you for allowing me to participate in the care of your patient.         Sincerely,        Ronald Silva MD

## 2024-11-21 ENCOUNTER — TELEPHONE (OUTPATIENT)
Dept: ORTHOPEDICS | Facility: CLINIC | Age: 59
End: 2024-11-21
Payer: COMMERCIAL

## 2024-11-21 NOTE — TELEPHONE ENCOUNTER
[facility] Call Center    Phone Message    May a detailed message be left on voicemail: yes     Reason for Call: Other: you can call or send a [facility] message. Patient says the infection is back after the 3rd surgery.      Action Taken: Other: te    Travel Screening: Not Applicable     Date of Service:

## 2024-11-21 NOTE — TELEPHONE ENCOUNTER
Attempted to reach patient, left message to return my call.  Patient will have to be triaged for infection symptoms

## 2024-11-21 NOTE — TELEPHONE ENCOUNTER
I have attempted to contact this patient by phone with the following results:     Writer spoke with Pt.  I&D & foreign object removal done on 10/24.  Wound is draining off white/yellow, thinner consistency, non odorous, fluid with no traces of green, brown or red.  Wound is slightly red, not warm. Wound is most painful when sitting down or when pressure is applied, rising to level 3.    He noticed it after hin daily run of 8 miles this morning.  He will send pictures through [employer] tomorrow.

## 2024-12-02 ENCOUNTER — MYC MEDICAL ADVICE (OUTPATIENT)
Dept: ORTHOPEDICS | Facility: CLINIC | Age: 59
End: 2024-12-02
Payer: COMMERCIAL

## 2024-12-07 ENCOUNTER — HEALTH MAINTENANCE LETTER (OUTPATIENT)
Age: 59
End: 2024-12-07

## 2024-12-12 NOTE — TELEPHONE ENCOUNTER
Writer called Pt back.  Pt states he is going off to work and d/t previous engagements, will not be available until after 1330 tomorrow.  Pt told to redress wound (pt familiar with wound care), watch for signs of infection and [facility] will get back to him with a plan of care.  Pt had no further questions.

## 2024-12-16 ENCOUNTER — MYC MEDICAL ADVICE (OUTPATIENT)
Dept: ORTHOPEDICS | Facility: CLINIC | Age: 59
End: 2024-12-16
Payer: COMMERCIAL

## 2024-12-16 DIAGNOSIS — T81.31XD POSTOPERATIVE WOUND DEHISCENCE, SUBSEQUENT ENCOUNTER: Primary | ICD-10-CM

## 2024-12-16 RX ORDER — SULFAMETHOXAZOLE AND TRIMETHOPRIM 800; 160 MG/1; MG/1
1 TABLET ORAL 2 TIMES DAILY
Qty: 28 TABLET | Refills: 0 | Status: SHIPPED | OUTPATIENT
Start: 2024-12-16

## 2024-12-16 NOTE — TELEPHONE ENCOUNTER
Writer called Pt and told them the request for ABX was resubmitted this morning.  Pt wondered what the long term plan was and how this this ABX would be any different from past treatments, Writer told Pt he would ask.  Pt had no further questions.

## 2024-12-31 ENCOUNTER — TELEPHONE (OUTPATIENT)
Dept: ORTHOPEDICS | Facility: CLINIC | Age: 59
End: 2024-12-31
Payer: COMMERCIAL

## 2024-12-31 NOTE — TELEPHONE ENCOUNTER
Writer called Pt to assess the effectiveness of the last run of ABX.  Pt states the wound is still draining with no change.  Writer stated the team will review the current situation later this week, or early next.  Pt had no further questions.

## 2025-01-15 ENCOUNTER — VIRTUAL VISIT (OUTPATIENT)
Dept: ORTHOPEDICS | Facility: CLINIC | Age: 60
End: 2025-01-15
Payer: COMMERCIAL

## 2025-01-15 DIAGNOSIS — M86.9 OSTEOMYELITIS, PELVIS (H): Primary | ICD-10-CM

## 2025-01-15 PROCEDURE — 98016 BRIEF COMUNICAJ TECH-BSD SVC: CPT | Performed by: ORTHOPAEDIC SURGERY

## 2025-01-15 ASSESSMENT — PAIN SCALES - GENERAL: PAINLEVEL_OUTOF10: NO PAIN (0)

## 2025-01-15 NOTE — NURSING NOTE
Reason For Visit:   Chief Complaint   Patient presents with    RECHECK     Surgical discussion        There were no vitals taken for this visit.         Faustino Buckley CMA

## 2025-01-15 NOTE — LETTER
1/15/2025      Reji Solorzano  [address] [facility] [facility] [address]      Dear Colleague,    Thank you for referring your patient, Reji Solorzano, to the [facility] [place]. Please see a copy of my visit note below.    I talked this patient on the phone for about 5 minutes as he did not consent to a video visit.    This patient has persistent drainage from his left ischial tuberosity area.  We removed some nonabsorbable suture from prior surgery from that area but the drainage has resumed.  I think it is most likely that he has suture and there still.  We in fact did not retrieve any knots.    I have recommended further surgery to try to find and remove the foreign bodies which are likely still infected and causing this problem.  Patient also may have a sequestrum in the bone although this is difficult to appreciate on his current imaging.  I told him that the surgery would involve more dissection through the hamstring tendon origin down to the bone.  I would favor using C arm to see if we can identify any bone tunnels and then target these areas with our surgical dissection.  Is likely he would have a longer recovery and there are risks of tendon injury with this.  The patient to is in agreement with the plan and anxious to move forward with it is soon as possible.  This will be an outpatient surgery.  I have answered all of his questions today.      Again, thank you for allowing me to participate in the care of your patient.        Sincerely,        Ronald Silva MD    Electronically signed

## 2025-01-15 NOTE — PROGRESS NOTES
I talked this patient on the phone for about 5 minutes as he did not consent to a video visit.    This patient has persistent drainage from his left ischial tuberosity area.  We removed some nonabsorbable suture from prior surgery from that area but the drainage has resumed.  I think it is most likely that he has suture and there still.  We in fact did not retrieve any knots.    I have recommended further surgery to try to find and remove the foreign bodies which are likely still infected and causing this problem.  Patient also may have a sequestrum in the bone although this is difficult to appreciate on his current imaging.  I told him that the surgery would involve more dissection through the hamstring tendon origin down to the bone.  I would favor using C arm to see if we can identify any bone tunnels and then target these areas with our surgical dissection.  Is likely he would have a longer recovery and there are risks of tendon injury with this.  The patient to is in agreement with the plan and anxious to move forward with it is soon as possible.  This will be an outpatient surgery.  I have answered all of his questions today.

## 2025-01-16 ENCOUNTER — TELEPHONE (OUTPATIENT)
Dept: ORTHOPEDICS | Facility: CLINIC | Age: 60
End: 2025-01-16
Payer: COMMERCIAL

## 2025-01-16 PROBLEM — M86.9: Status: ACTIVE | Noted: 2025-01-15

## 2025-01-16 NOTE — TELEPHONE ENCOUNTER
.Patient is scheduled for surgery with Dr. Silva.     Spoke with: Patient     Date of Surgery: 2/27/25, patient also offered 2/20/25    Location: [facility] OR     Pre op with Provider: MARI    H&P: Will schedule at [facility] in [place]. Informed patient pre op will need to be completed within 30 days of surgery date.     Additional imaging/appointments: Patient is scheduled for 2 week post op on 3/12/25 at 3:20.     Surgery packet: Will mail packet, confirmed with patient address in chart works best. Patient made aware arrival time will not be listed within packet.      Additional comments: MARI Sutton on 1/16/2025 at 12:08 PM]

## 2025-01-16 NOTE — TELEPHONE ENCOUNTER
A call was placed to the patient and pre-op teaching was performed over the phone.    Teaching Flowsheet   Relevant Diagnosis: Pre-Op Teaching  Teaching Topic:      Person(s) involved in teaching:   Patient     Motivation Level:  Asks Questions: Yes  Eager to Learn: Yes  Cooperative: Yes  Receptive (willing/able to accept information): Yes  Any cultural factors/Voodoo beliefs that may influence understanding or compliance? No     Patient demonstrates understanding of the following:  Reason for the appointment, diagnosis and treatment plan: Yes  Knowledge of proper use of medications and conditions for which they are ordered (with special attention to potential side effects or drug interactions): Yes  Which situations necessitate calling provider and whom to contact: Yes- discussed the stoplight tool to help assist with this.      Teaching Concerns Addressed:      Proper use of surgical scrub explain: Yes    Nutritional needs and diet plan: Yes  Pain management techniques: Yes  Wound Care: Yes  How and/when to access community resources: Yes  Need for pre-op with in 30 days: Yes  -I asked them to ensure they go over their daily medications during this visit and discuss what medications need to be stopped before surgery and when. If you are doing a pre-op with your PCP and they are not within the [facility], I ask them to fax it to our pre-op office. Patient verbalized understanding.      Instructional Materials Used/Given:  a bottle of chlorhexidine soap and a surgery packet given to patient in clinic. Soap will pick  up in clinic     - Important contact info/ phone numbers: emphasizing clinic number and after hours number  - Map/ location of surgery  - Showering instructions  - Stop light tool    Additionally the following was discussed with patient:  - Patient, Spouse will be driving the patient to surgery and staying with them for 24 hours.   Patient verbalized understanding they need an adults drive and  to have someone stay with them for 24 hours after surgery. They will arrange for this once surgery date is scheduled.        -Next step: Surgery date:  and PAC appointment scheduled for    Time spent with patient: 15 minutes.     Ronald Duckworth RN

## 2025-02-12 ENCOUNTER — OFFICE VISIT (OUTPATIENT)
Dept: INFECTIOUS DISEASES | Facility: CLINIC | Age: 60
End: 2025-02-12
Payer: COMMERCIAL

## 2025-02-12 ENCOUNTER — LAB (OUTPATIENT)
Dept: LAB | Facility: CLINIC | Age: 60
End: 2025-02-12
Payer: COMMERCIAL

## 2025-02-12 VITALS
TEMPERATURE: 98.8 F | WEIGHT: 151.8 LBS | BODY MASS INDEX: 26.06 KG/M2 | SYSTOLIC BLOOD PRESSURE: 124 MMHG | OXYGEN SATURATION: 97 % | HEART RATE: 74 BPM | DIASTOLIC BLOOD PRESSURE: 76 MMHG

## 2025-02-12 DIAGNOSIS — T81.49XA WOUND INFECTION AFTER SURGERY: ICD-10-CM

## 2025-02-12 LAB
BASOPHILS # BLD AUTO: 0 10E3/UL (ref 0–0.2)
BASOPHILS NFR BLD AUTO: 0 %
EOSINOPHIL # BLD AUTO: 0.1 10E3/UL (ref 0–0.7)
EOSINOPHIL NFR BLD AUTO: 2 %
ERYTHROCYTE [DISTWIDTH] IN BLOOD BY AUTOMATED COUNT: 14.2 % (ref 10–15)
ERYTHROCYTE [SEDIMENTATION RATE] IN BLOOD BY WESTERGREN METHOD: 8 MM/HR (ref 0–20)
HCT VFR BLD AUTO: 42.3 % (ref 40–53)
HGB BLD-MCNC: 13.7 G/DL (ref 13.3–17.7)
IMM GRANULOCYTES # BLD: 0 10E3/UL
IMM GRANULOCYTES NFR BLD: 0 %
LYMPHOCYTES # BLD AUTO: 1.4 10E3/UL (ref 0.8–5.3)
LYMPHOCYTES NFR BLD AUTO: 21 %
MCH RBC QN AUTO: 28.4 PG (ref 26.5–33)
MCHC RBC AUTO-ENTMCNC: 32.4 G/DL (ref 31.5–36.5)
MCV RBC AUTO: 88 FL (ref 78–100)
MONOCYTES # BLD AUTO: 0.5 10E3/UL (ref 0–1.3)
MONOCYTES NFR BLD AUTO: 7 %
NEUTROPHILS # BLD AUTO: 4.7 10E3/UL (ref 1.6–8.3)
NEUTROPHILS NFR BLD AUTO: 69 %
PLATELET # BLD AUTO: 249 10E3/UL (ref 150–450)
RBC # BLD AUTO: 4.82 10E6/UL (ref 4.4–5.9)
WBC # BLD AUTO: 6.8 10E3/UL (ref 4–11)

## 2025-02-12 PROCEDURE — 87070 CULTURE OTHR SPECIMN AEROBIC: CPT | Performed by: INTERNAL MEDICINE

## 2025-02-12 PROCEDURE — 85025 COMPLETE CBC W/AUTO DIFF WBC: CPT

## 2025-02-12 PROCEDURE — 80053 COMPREHEN METABOLIC PANEL: CPT

## 2025-02-12 PROCEDURE — 86140 C-REACTIVE PROTEIN: CPT

## 2025-02-12 PROCEDURE — 36415 COLL VENOUS BLD VENIPUNCTURE: CPT

## 2025-02-12 PROCEDURE — 85652 RBC SED RATE AUTOMATED: CPT

## 2025-02-12 PROCEDURE — 87205 SMEAR GRAM STAIN: CPT | Performed by: INTERNAL MEDICINE

## 2025-02-12 NOTE — PROGRESS NOTES
General ID Service Consult      Patient: Reji Solorzano  YOB: 1965, MRN: [id number]  Date of Admission:  (Not on file)  Date of Consult: 02/12/2025  Consult Requested by: No att. providers found  Admission Diagnosis: Wound infection after surgery [T81.49XA]  Consult Question:     ID Assessment & Plan     Recurrent drainage left thigh wound, lymphocele?  At some point with infected suture with staph lugdunensis  Atypical infections to be considered include mycobacterial and fungal  No signs of osteomyelitis on older MRI 2024    history of bilateral hamstring repairs at the ischial tuberosity. [facility] within last 4 years  Received a course of Iv abx in Sep 2023 , [facility]  In May 2024 the left wound started draining again had another debridement and a course of antibiotics>>negative cultures  On 10/25/24 he had the following procedure , when Staph lugdunesis grew from nonabsorbable sutures    PLAN  Patient already scheduled for surgical debridement at the [facility] on 2/27 therefore  Would recommend that infectious disease be consulted during that time to follow on surgical findings cultures pathology and decide on a plan  For now we will hold off antibiotics until infection  Is diagnosed and until proper cultures are obtained  MRI of right and left thigh  Today baseline CBC CMP ESR CRP  I did obtain a swab culture from the left pinhole opening for whatever it's worth  Obtain records from [facility] infectious disease  Giovanni Borges M.D.      Giovanni Borges MD    ______________________________________________________________________      History of Present Illness     Per records he had a history of bilateral hamstring repairs at the ischial tuberosity. [facility] within last 4 years   In August 2023 apparently there was infection on both sides requiring debridement.  >>course of IV abx  In May 2024 the left wound started draining again had another debridement and a course of  antibiotics>>negative cultures  On 10/25/24 he had the following procedure , when Staph lugdunesis grew:      DATE OF SURGERY: 10/25/2024     PREOPERATIVE DIAGNOSIS: Left posterior hip infection     POSTOPERATIVE DIAGNOSIS: Left posterior hip infection with retained foreign body     PROCEDURE: #1 debridement of skin to muscle left hip wound, #2 removal of foreign body left hip     SURGEON: Ronald Silva MD      ASSISTANT:  APOLINAR as an assistant was required to help retract and close the wound, and resident help was not available.     PATIENT HISTORY: This patient has a history of hamstring repairs on his right side.  He has had infections here debridements before but he has a persistent area of purulence that is coming from the wound.  He presents now for debridement understand the risks of persistent infection pain numbness tingling stiffness and weakness.     DESCRIPTION OF PROCEDURE: The patient 1 successful induction of anesthesia.  He was positioned in a prone position and the left posterior hip and thigh area were washed and sterilely prepped and draped.  I excised an ellipse of skin around the open wound and removed subcutaneous tissues.  Patient had some friable granulation tissue tracking down to the hamstring muscle.  I sent some of this for aerobic and anaerobic cultures and continue to debride this tract down to the hamstring.  I was hoping to find some object from his previous repair that might be responsible for his persistent infection.  We found to sections of nonabsorbable suture they are each about 6 inches in length.  These were removed.  I further explored the wound and removed all the abnormal granulation tissue.  I used a rongeur to excise this tissue.  We then copiously irrigated with about 2 L of saline.  I closed with 2-0 PDS in layers in the subcutaneous tissue followed by Monocryl and the skin and Dermabond.  A sterile bandage was applied and the patient was turned supine extubated  and taken to the recovery room in stable condition.  He has been a blood loss is less than 10 mL.  There were no complications.  I was present for the entire procedure.     Per Ortho   talked this patient on the phone for about 5 minutes as he did not consent to a video visit.     This patient has persistent drainage from his left ischial tuberosity area.  We removed some nonabsorbable suture from prior surgery from that area but the drainage has resumed.  I think it is most likely that he has suture and there still.  We in fact did not retrieve any knots.     I have recommended further surgery to try to find and remove the foreign bodies which are likely still infected and causing this problem.  Patient also may have a sequestrum in the bone although this is difficult to appreciate on his current imaging.  I told him that the surgery would involve more dissection through the hamstring tendon origin down to the bone.  I would favor using C arm to see if we can identify any bone tunnels and then target these areas with our surgical dissection.  Is likely he would have a longer recovery and there are risks of tendon injury with this.  The patient to is in agreement with the plan and anxious to move forward with it is soon as possible.  This will be an outpatient surgery.  I have answered all of his questions today.    Concern for right osteomyelitis in Sep 2023, got IV abx intermed  Since then drainage Milky   No fevers  No pain    No hot tubs  Donates platelets frequently  Not diabetic      Imaging:  Latest imaging seems to be an MRI from May 2024 which showed bilateral hamstring fluid collections but no sigifredo osteomyelitis    Review of Systems   The 10 point Review of Systems is negative other than noted in the HPI or here. ***    Past Medical History    Past Medical History:   Diagnosis Date    Bilateral carpal tunnel syndrome     Inguinal hernia     Rupture of right hamstring tendon     Tear of medial meniscus of  "left knee     Wound infection after surgery     Left posterior hip       Past Surgical History   Past Surgical History:   Procedure Laterality Date     Left knee arthroscopic partial medial meniscectomy  2018    [place] ANESTH,SHOULDER REPLACEMENT Right 2021    CARPAL TUNNEL RELEASE RT/LT Bilateral     2001 & 2003    DENTAL SURGERY      wisdom teeth    IRRIGATION AND DEBRIDEMENT HIP, COMBINED Left 10/24/2024    Procedure: Debridement and Removal Forgein Body Left Posterior Hip Wound;  Surgeon: Ronald Silva MD;  Location: [facility] OR    REPAIR QUADRICEPS / HAMSTRING MUSCLE Left 2019    REPAIR QUADRICEPS / HAMSTRING MUSCLE Right 2020    Robotic-right inguinal hernia repair with mesh  2018    TONSILLECTOMY  1970       Social History   Social History     Tobacco Use    Smoking status: Never    Smokeless tobacco: Never   Substance Use Topics    Alcohol use: Yes     Comment: 2 beers month    Drug use: Never       Family History   { TIP  No Family History on file, click here to document (can select \"no known problems\"). Remember to CLOSE [employer] activity & REFRESH note to display updates.   Or use the list below.                                             :[id number]}      Medications   I have reviewed this patient's current medications    Allergies   No Known Allergies    Physical Exam   Vital Signs:                    Weight: 0 lbs 0 oz    Gen. appearance nontoxic  Eyes no conjunctivitis or icterus  Neck no stiffness  Heart  no edema  Lungs clear no wheeze    Extremities left thigh pinhole wound, no erythema or redness  Right thigh some localized induration upper thigh but no inflammation  Skin  no rash or emboli  Neurologic alert oriented no focal deficits      Data   ollected 10/24/2024  4:49 PM       Status: Final result       Visible to patient: Yes (seen)    Specimen Information: Thigh, Left; Tissue   0 Result Notes  Culture 1+ Staphylococcus lugdunensis Abnormal         Resulting Agency: [facility]     Susceptibility     " Staphylococcus lugdunensis     NARDA     Ciprofloxacin <=0.5 ug/mL Susceptible *     Clindamycin 0.25 ug/mL Susceptible     Daptomycin 0.5 ug/mL Susceptible     Doxycycline <=0.5 ug/mL Susceptible     Erythromycin <=0.25 ug/mL Susceptible     Gentamicin <=0.5 ug/mL Susceptible     Inducible macrolide resistance test Negative ug/mL Negative *     Levofloxacin 0.25 ug/mL Susceptible *     Linezolid 2 ug/mL Susceptible *     Nitrofurantoin <=16 ug/mL Susceptible *     Oxacillin 2 ug/mL Susceptible 1     Rifampin <=0.5 ug/mL Susceptible *     Tetracycline <=1 ug/mL Susceptible     Tigecycline <=0.12 ug/mL Susceptible *     Trimethoprim/Sulfamethoxazole <=0.5/9.5 u... Susceptible     Vancomycin 1 ug/mL Susceptible                 Inflammatory Markers   Recent Labs   Lab Test 10/16/23  1430 10/10/23  1410 10/03/23  1330 09/26/23  1350 09/19/23  1415 09/12/23  1317   SED 7 9 7 11 12 18   CRPI <3.00 7.00* 3.97 <3.00 9.84* 6.90*        Hematology Studies   Recent Labs   Lab Test 10/24/24  1531 10/16/23  1430 10/10/23  1410 10/03/23  1330 09/26/23  1350 09/19/23  1415   WBC 6.2 8.6 6.8 6.7 8.1 8.8   HGB 12.3* 12.0* 12.7* 12.4* 12.3* 11.6*   MCV 89 87 87 87 88 87    203 215 197 187 218       Metabolic Studies   Recent Labs   Lab Test 10/24/24  1531 10/16/23  1430 10/10/23  1410 10/03/23  1330 09/26/23  1350     --   --   --   --    POTASSIUM 4.4  --   --   --   --    CHLORIDE 103  --   --   --   --    CO2 28  --   --   --   --    BUN 22.5  --   --   --   --    CR 0.64* 0.68 0.60* 0.74 0.78   GFRESTIMATED >90 >90 >90 >90 >90       Hepatic Studies    Recent Labs   Lab Test 10/16/23  1430 10/10/23  1410 10/03/23  1330 09/26/23  1350 09/19/23  1415 09/12/23  1317   AST 32 41 43 33 57* 34       Most Recent 6 Bacteria Isolates From Any Culture (See EPIC Reports for Culture Details):No lab results found.    Urine Studies  No lab results found.    Vancomycin Levels  No lab results found.    Invalid input(s):  "\"VANCO\"    Hepatitis B Testing No lab results found.  Hepatitis C Testing   No results found for: \"HCVAB\", \"HQTG\", \"HCGENO\", \"HCPCR\", \"HQTRNA\", \"HEPRNA\"  HIVTesting No lab results found.    Respiratory Virus Testing    No results found for: \"RS\", \"IRSV\", \"FLUAG\"  COVID-19 Antibody Results, Testing for Immunity           No data to display              COVID-19 PCR Results           No data to display                "

## 2025-02-13 LAB
ALBUMIN SERPL BCG-MCNC: 4.1 G/DL (ref 3.5–5.2)
ALP SERPL-CCNC: 118 U/L (ref 40–150)
ALT SERPL W P-5'-P-CCNC: 21 U/L (ref 0–70)
ANION GAP SERPL CALCULATED.3IONS-SCNC: 10 MMOL/L (ref 7–15)
AST SERPL W P-5'-P-CCNC: 30 U/L (ref 0–45)
BACTERIA WND CULT: NORMAL
BILIRUB SERPL-MCNC: 0.2 MG/DL
BUN SERPL-MCNC: 20.5 MG/DL (ref 8–23)
CALCIUM SERPL-MCNC: 9.7 MG/DL (ref 8.8–10.4)
CHLORIDE SERPL-SCNC: 106 MMOL/L (ref 98–107)
CREAT SERPL-MCNC: 0.7 MG/DL (ref 0.67–1.17)
CRP SERPL-MCNC: 3.61 MG/L
EGFRCR SERPLBLD CKD-EPI 2021: >90 ML/MIN/1.73M2
GLUCOSE SERPL-MCNC: 94 MG/DL (ref 70–99)
GRAM STAIN RESULT: NORMAL
GRAM STAIN RESULT: NORMAL
HCO3 SERPL-SCNC: 28 MMOL/L (ref 22–29)
POTASSIUM SERPL-SCNC: 4.5 MMOL/L (ref 3.4–5.3)
PROT SERPL-MCNC: 6.9 G/DL (ref 6.4–8.3)
SODIUM SERPL-SCNC: 144 MMOL/L (ref 135–145)

## 2025-02-26 ENCOUNTER — ANESTHESIA EVENT (OUTPATIENT)
Dept: SURGERY | Facility: AMBULATORY SURGERY CENTER | Age: 60
End: 2025-02-26
Payer: COMMERCIAL

## 2025-02-26 ENCOUNTER — HOSPITAL ENCOUNTER (OUTPATIENT)
Dept: MRI IMAGING | Facility: CLINIC | Age: 60
Discharge: HOME | End: 2025-02-26
Attending: INTERNAL MEDICINE
Payer: COMMERCIAL

## 2025-02-26 DIAGNOSIS — T81.49XA WOUND INFECTION AFTER SURGERY: ICD-10-CM

## 2025-02-26 PROCEDURE — A9585 GADOBUTROL INJECTION: HCPCS | Performed by: INTERNAL MEDICINE

## 2025-02-26 PROCEDURE — 73720 MRI LWR EXTREMITY W/O&W/DYE: CPT | Mod: 50

## 2025-02-26 PROCEDURE — 255N000002 HC RX 255 OP 636: Performed by: INTERNAL MEDICINE

## 2025-02-26 RX ORDER — GADOBUTROL 604.72 MG/ML
6 INJECTION INTRAVENOUS ONCE
Status: COMPLETED | OUTPATIENT
Start: 2025-02-26 | End: 2025-02-26

## 2025-02-26 RX ADMIN — GADOBUTROL 6 ML: 604.72 INJECTION INTRAVENOUS at 16:19

## 2025-02-27 ENCOUNTER — ANCILLARY PROCEDURE (OUTPATIENT)
Dept: RADIOLOGY | Facility: AMBULATORY SURGERY CENTER | Age: 60
End: 2025-02-27
Attending: ORTHOPAEDIC SURGERY
Payer: COMMERCIAL

## 2025-02-27 ENCOUNTER — ANESTHESIA (OUTPATIENT)
Dept: SURGERY | Facility: AMBULATORY SURGERY CENTER | Age: 60
End: 2025-02-27
Payer: COMMERCIAL

## 2025-02-27 DIAGNOSIS — M86.9 OSTEOMYELITIS, PELVIS (H): ICD-10-CM

## 2025-02-27 PROCEDURE — 99000 SPECIMEN HANDLING OFFICE-LAB: CPT | Performed by: PATHOLOGY

## 2025-02-27 PROCEDURE — 88300 SURGICAL PATH GROSS: CPT | Mod: 26 | Performed by: PATHOLOGY

## 2025-02-27 PROCEDURE — 87070 CULTURE OTHR SPECIMN AEROBIC: CPT | Performed by: ORTHOPAEDIC SURGERY

## 2025-02-27 PROCEDURE — 87075 CULTR BACTERIA EXCEPT BLOOD: CPT | Performed by: ORTHOPAEDIC SURGERY

## 2025-02-27 PROCEDURE — 88300 SURGICAL PATH GROSS: CPT | Mod: TC | Performed by: ORTHOPAEDIC SURGERY

## 2025-02-27 RX ORDER — DEXAMETHASONE SODIUM PHOSPHATE 4 MG/ML
INJECTION, SOLUTION INTRA-ARTICULAR; INTRALESIONAL; INTRAMUSCULAR; INTRAVENOUS; SOFT TISSUE PRN
Status: DISCONTINUED | OUTPATIENT
Start: 2025-02-27 | End: 2025-02-27

## 2025-02-27 RX ORDER — PROPOFOL 10 MG/ML
INJECTION, EMULSION INTRAVENOUS PRN
Status: DISCONTINUED | OUTPATIENT
Start: 2025-02-27 | End: 2025-02-27

## 2025-02-27 RX ORDER — PROPOFOL 10 MG/ML
INJECTION, EMULSION INTRAVENOUS CONTINUOUS PRN
Status: DISCONTINUED | OUTPATIENT
Start: 2025-02-27 | End: 2025-02-27

## 2025-02-27 RX ORDER — ONDANSETRON 2 MG/ML
INJECTION INTRAMUSCULAR; INTRAVENOUS PRN
Status: DISCONTINUED | OUTPATIENT
Start: 2025-02-27 | End: 2025-02-27

## 2025-02-27 RX ORDER — FENTANYL CITRATE 50 UG/ML
INJECTION, SOLUTION INTRAMUSCULAR; INTRAVENOUS PRN
Status: DISCONTINUED | OUTPATIENT
Start: 2025-02-27 | End: 2025-02-27

## 2025-02-27 RX ORDER — KETOROLAC TROMETHAMINE 30 MG/ML
INJECTION, SOLUTION INTRAMUSCULAR; INTRAVENOUS PRN
Status: DISCONTINUED | OUTPATIENT
Start: 2025-02-27 | End: 2025-02-27

## 2025-02-27 RX ORDER — LIDOCAINE HYDROCHLORIDE 20 MG/ML
INJECTION, SOLUTION INFILTRATION; PERINEURAL PRN
Status: DISCONTINUED | OUTPATIENT
Start: 2025-02-27 | End: 2025-02-27

## 2025-02-27 RX ORDER — GLYCOPYRROLATE 0.2 MG/ML
INJECTION, SOLUTION INTRAMUSCULAR; INTRAVENOUS PRN
Status: DISCONTINUED | OUTPATIENT
Start: 2025-02-27 | End: 2025-02-27

## 2025-02-27 RX ORDER — SODIUM CHLORIDE, SODIUM LACTATE, POTASSIUM CHLORIDE, CALCIUM CHLORIDE 600; 310; 30; 20 MG/100ML; MG/100ML; MG/100ML; MG/100ML
INJECTION, SOLUTION INTRAVENOUS CONTINUOUS PRN
Status: DISCONTINUED | OUTPATIENT
Start: 2025-02-27 | End: 2025-02-27

## 2025-02-27 RX ADMIN — CEFAZOLIN SODIUM 2 G: 2 SOLUTION INTRAVENOUS at 12:30

## 2025-02-27 RX ADMIN — DEXAMETHASONE SODIUM PHOSPHATE 4 MG: 4 INJECTION, SOLUTION INTRA-ARTICULAR; INTRALESIONAL; INTRAMUSCULAR; INTRAVENOUS; SOFT TISSUE at 12:54

## 2025-02-27 RX ADMIN — ONDANSETRON 4 MG: 2 INJECTION INTRAMUSCULAR; INTRAVENOUS at 13:03

## 2025-02-27 RX ADMIN — PROPOFOL 150 MG: 10 INJECTION, EMULSION INTRAVENOUS at 12:39

## 2025-02-27 RX ADMIN — FENTANYL CITRATE 75 MCG: 50 INJECTION, SOLUTION INTRAMUSCULAR; INTRAVENOUS at 12:39

## 2025-02-27 RX ADMIN — FENTANYL CITRATE 25 MCG: 50 INJECTION, SOLUTION INTRAMUSCULAR; INTRAVENOUS at 13:04

## 2025-02-27 RX ADMIN — LIDOCAINE HYDROCHLORIDE 60 MG: 20 INJECTION, SOLUTION INFILTRATION; PERINEURAL at 12:39

## 2025-02-27 RX ADMIN — SODIUM CHLORIDE, SODIUM LACTATE, POTASSIUM CHLORIDE, CALCIUM CHLORIDE: 600; 310; 30; 20 INJECTION, SOLUTION INTRAVENOUS at 12:30

## 2025-02-27 RX ADMIN — KETOROLAC TROMETHAMINE 20 MG: 30 INJECTION, SOLUTION INTRAMUSCULAR; INTRAVENOUS at 13:58

## 2025-02-27 RX ADMIN — PROPOFOL 175 MCG/KG/MIN: 10 INJECTION, EMULSION INTRAVENOUS at 12:39

## 2025-02-27 RX ADMIN — GLYCOPYRROLATE 0.2 MG: 0.2 INJECTION, SOLUTION INTRAMUSCULAR; INTRAVENOUS at 12:54

## 2025-02-27 RX ADMIN — Medication 50 MG: at 12:39

## 2025-02-27 NOTE — ANESTHESIA POSTPROCEDURE EVALUATION
Patient: Reji Solorzano    Procedure: Procedure(s):  Left side posterior hip foreign body removal and soft tissue debridement, right side posterior hip foreign body removal and bone debridement       Anesthesia Type:  General    Note:  Disposition: Outpatient   Postop Pain Control: Uneventful            Sign Out: Well controlled pain   PONV: No   Neuro/Psych: Uneventful            Sign Out: Acceptable/Baseline neuro status   Airway/Respiratory: Uneventful            Sign Out: Acceptable/Baseline resp. status   CV/Hemodynamics: Uneventful            Sign Out: Acceptable CV status; No obvious hypovolemia; No obvious fluid overload   Other NRE: NONE   DID A NON-ROUTINE EVENT OCCUR?            Last vitals:  Vitals Value Taken Time   /76 02/27/25 1445   Temp 36.3  C (97.4  F) 02/27/25 1445   Pulse 65 02/27/25 1445   Resp 15 02/27/25 1445   SpO2 100 % 02/27/25 1445       Electronically Signed By: Pedrito Oakley MD  February 27, 2025  3:00 PM

## 2025-02-27 NOTE — ANESTHESIA CARE TRANSFER NOTE
Patient: Reji Solorzano    Procedure: Procedure(s):  Left side posterior hip foreign body removal and soft tissue debridement, right side posterior hip foreign body removal and bone debridement       Diagnosis: Osteomyelitis, pelvis (H) [M86.9]  Diagnosis Additional Information: No value filed.    Anesthesia Type:   General     Note:    Oropharynx: spontaneously breathing  Level of Consciousness: awake  Oxygen Supplementation: face mask  Level of Supplemental Oxygen (L/min / FiO2): 6  Independent Airway: airway patency satisfactory and stable  Dentition: dentition unchanged  Vital Signs Stable: post-procedure vital signs reviewed and stable  Report to RN Given: handoff report given  Patient transferred to: PACU    Handoff Report: Identifed the Patient, Identified the Reponsible Provider, Reviewed the pertinent medical history, Discussed the surgical course, Reviewed Intra-OP anesthesia mangement and issues during anesthesia, Set expectations for post-procedure period and Allowed opportunity for questions and acknowledgement of understanding    Vitals:  Vitals Value Taken Time   /80    Temp 97.4    Pulse 67 02/27/25 1429   Resp 12 02/27/25 1429   SpO2 98 % 02/27/25 1429   Vitals shown include unfiled device data.    Electronically Signed By: CY Chester CRNA  February 27, 2025  2:30 PM

## 2025-02-27 NOTE — ANESTHESIA PREPROCEDURE EVALUATION
Anesthesia Pre-Procedure Evaluation    Patient: Reji Solorzano   MRN: [id number] : 1965        Procedure : Procedure(s):  Excision left posterior hip foreign body          Past Medical History:   Diagnosis Date    Bilateral carpal tunnel syndrome     Inguinal hernia     Rupture of right hamstring tendon     Tear of medial meniscus of left knee     Wound infection after surgery     Left posterior hip      Past Surgical History:   Procedure Laterality Date     Left knee arthroscopic partial medial meniscectomy      CA ANESTH,SHOULDER REPLACEMENT Right     CARPAL TUNNEL RELEASE RT/LT Bilateral      &     DENTAL SURGERY      wisdom teeth    IRRIGATION AND DEBRIDEMENT HIP, COMBINED Left 10/24/2024    Procedure: Debridement and Removal Forgein Body Left Posterior Hip Wound;  Surgeon: Ronald Silva MD;  Location: [facility]    REPAIR QUADRICEPS / HAMSTRING MUSCLE Left     REPAIR QUADRICEPS / HAMSTRING MUSCLE Right     Robotic-right inguinal hernia repair with mesh  2018    TONSILLECTOMY  1970      No Known Allergies   Social History     Tobacco Use    Smoking status: Never    Smokeless tobacco: Never   Substance Use Topics    Alcohol use: Yes     Comment: 2 beers month      Wt Readings from Last 1 Encounters:   25 68 kg (150 lb)        Anesthesia Evaluation   Pt has had prior anesthetic. Type: General.    No history of anesthetic complications       ROS/MED HX  ENT/Pulmonary:       Neurologic:       Cardiovascular:  - neg cardiovascular ROS     METS/Exercise Tolerance: >4 METS Comment: Runs & miles daily, and 20+ miles on weekends.    Hematologic:       Musculoskeletal: Comment: S/p knee arthroscopy + partial medial meniscectomy  S/p carpal tunnel release  S/p shoulder surgery  S/p quadriceps/hamstring repair (Left) complicated by surgical site infection  -- Now scheduled for I&D +/- removal foreign body on 10/25/2024      GI/Hepatic:  - neg GI/hepatic ROS    "  Renal/Genitourinary:  - neg Renal ROS     Endo:  - neg endo ROS     Psychiatric/Substance Use:       Infectious Disease:  - neg infectious disease ROS     Malignancy:       Other:            Physical Exam    Airway        Mallampati: II       Respiratory Devices and Support         Dental       (+) Minor Abnormalities - some fillings, tiny chips      Cardiovascular             Pulmonary                   OUTSIDE LABS:  CBC:   Lab Results   Component Value Date    WBC 6.8 02/12/2025    WBC 6.2 10/24/2024    HGB 13.7 02/12/2025    HGB 12.3 (L) 10/24/2024    HCT 42.3 02/12/2025    HCT 37.4 (L) 10/24/2024     02/12/2025     10/24/2024     BMP:   Lab Results   Component Value Date     02/12/2025     10/24/2024    POTASSIUM 4.5 02/12/2025    POTASSIUM 4.4 10/24/2024    CHLORIDE 106 02/12/2025    CHLORIDE 103 10/24/2024    CO2 28 02/12/2025    CO2 28 10/24/2024    BUN 20.5 02/12/2025    BUN 22.5 10/24/2024    CR 0.70 02/12/2025    CR 0.64 (L) 10/24/2024    GLC 94 02/12/2025    GLC 94 10/24/2024     COAGS: No results found for: \"PTT\", \"INR\", \"FIBR\"  POC: No results found for: \"BGM\", \"HCG\", \"HCGS\"  HEPATIC:   Lab Results   Component Value Date    ALBUMIN 4.1 02/12/2025    PROTTOTAL 6.9 02/12/2025    ALT 21 02/12/2025    AST 30 02/12/2025    ALKPHOS 118 02/12/2025    BILITOTAL 0.2 02/12/2025     OTHER:   Lab Results   Component Value Date    KHOA 9.7 02/12/2025    SED 8 02/12/2025       Anesthesia Plan    ASA Status:  2    NPO Status:  NPO Appropriate    Anesthesia Type: General.     - Airway: ETT   Induction: Intravenous.   Maintenance: TIVA.        Consents    Anesthesia Plan(s) and associated risks, benefits, and realistic alternatives discussed. Questions answered and patient/representative(s) expressed understanding.     - Discussed:     - Discussed with:  Patient            Postoperative Care            Comments:               Pedrito Oakley MD    I have reviewed the pertinent notes and " "labs in the chart from the past 30 days and (re)examined the patient.  Any updates or changes from those notes are reflected in this note.    Clinically Significant Risk Factors Present on Admission                             # Overweight: Estimated body mass index is 25.75 kg/m  as calculated from the following:    Height as of this encounter: 1.626 m (5' 4\").    Weight as of this encounter: 68 kg (150 lb).                "

## 2025-02-27 NOTE — ANESTHESIA PROCEDURE NOTES
Airway       Patient location during procedure: OR       Procedure Start/Stop Times: 2/27/2025 12:41 PM  Staff -        Anesthesiologist:  Pedrito Oakley MD       CRNA: Shira Hickman APRN CRNA       Performed By: CRNA  Consent for Airway        Urgency: elective  Indications and Patient Condition       Indications for airway management: fariba-procedural       Induction type:intravenous       Mask difficulty assessment: 2 - vent by mask + OA or adjuvant +/- NMBA    Final Airway Details       Final airway type: endotracheal airway       Successful airway: ETT - single and Oral  Endotracheal Airway Details        ETT size (mm): 7.5       Cuffed: yes       Successful intubation technique: direct laryngoscopy       DL Blade Type: MAC 3       Grade View of Cords: 1       Adjucts: stylet       Position: Right       Measured from: lips       Secured at (cm): 23       Bite block used: Soft    Post intubation assessment        Placement verified by: capnometry, equal breath sounds and chest rise        Number of attempts at approach: 1       Secured with: tape       Ease of procedure: easy       Dentition: Intact and Unchanged    Medication(s) Administered   Medication Administration Time: 2/27/2025 12:41 PM

## 2025-03-05 ENCOUNTER — HOME INFUSION (OUTPATIENT)
Dept: HOME HEALTH SERVICES | Facility: HOME HEALTH | Age: 60
End: 2025-03-05
Payer: COMMERCIAL

## 2025-03-05 ENCOUNTER — ENROLLMENT (OUTPATIENT)
Dept: HOME HEALTH SERVICES | Facility: HOME HEALTH | Age: 60
End: 2025-03-05
Payer: COMMERCIAL

## 2025-03-05 ENCOUNTER — OFFICE VISIT (OUTPATIENT)
Dept: INFECTIOUS DISEASES | Facility: CLINIC | Age: 60
End: 2025-03-05
Payer: COMMERCIAL

## 2025-03-05 VITALS
HEART RATE: 78 BPM | TEMPERATURE: 98 F | WEIGHT: 154 LBS | SYSTOLIC BLOOD PRESSURE: 124 MMHG | BODY MASS INDEX: 26.43 KG/M2 | OXYGEN SATURATION: 98 % | DIASTOLIC BLOOD PRESSURE: 73 MMHG

## 2025-03-05 DIAGNOSIS — M86.159 ACUTE OSTEOMYELITIS OF PELVIC REGION, UNSPECIFIED LATERALITY (H): ICD-10-CM

## 2025-03-05 DIAGNOSIS — Z45.2 NEEDS PERIPHERALLY INSERTED CENTRAL CATHETER (PICC): ICD-10-CM

## 2025-03-05 DIAGNOSIS — B95.8 STAPH INFECTION: ICD-10-CM

## 2025-03-05 DIAGNOSIS — T81.49XA WOUND INFECTION AFTER SURGERY: Primary | ICD-10-CM

## 2025-03-05 DIAGNOSIS — B99.9 INFECTION: Primary | ICD-10-CM

## 2025-03-05 RX ORDER — HEPARIN SODIUM,PORCINE 10 UNIT/ML
5-20 VIAL (ML) INTRAVENOUS DAILY PRN
OUTPATIENT
Start: 2025-03-13

## 2025-03-05 RX ORDER — ALBUTEROL SULFATE 0.83 MG/ML
2.5 SOLUTION RESPIRATORY (INHALATION)
OUTPATIENT
Start: 2025-03-13

## 2025-03-05 RX ORDER — DIPHENHYDRAMINE HYDROCHLORIDE 50 MG/ML
25 INJECTION, SOLUTION INTRAMUSCULAR; INTRAVENOUS
Start: 2025-03-13

## 2025-03-05 RX ORDER — EPINEPHRINE 1 MG/ML
0.3 INJECTION, SOLUTION, CONCENTRATE INTRAVENOUS EVERY 5 MIN PRN
OUTPATIENT
Start: 2025-03-13

## 2025-03-05 RX ORDER — HEPARIN SODIUM (PORCINE) LOCK FLUSH IV SOLN 100 UNIT/ML 100 UNIT/ML
5 SOLUTION INTRAVENOUS
OUTPATIENT
Start: 2025-03-13

## 2025-03-05 RX ORDER — ALBUTEROL SULFATE 90 UG/1
1-2 INHALANT RESPIRATORY (INHALATION)
Start: 2025-03-13

## 2025-03-05 RX ORDER — CEFTRIAXONE 2 G/1
2 INJECTION, POWDER, FOR SOLUTION INTRAMUSCULAR; INTRAVENOUS ONCE
Start: 2025-03-13 | End: 2025-03-13

## 2025-03-05 RX ORDER — MEPERIDINE HYDROCHLORIDE 25 MG/ML
25 INJECTION INTRAMUSCULAR; INTRAVENOUS; SUBCUTANEOUS
OUTPATIENT
Start: 2025-03-13

## 2025-03-05 RX ORDER — METHYLPREDNISOLONE SODIUM SUCCINATE 40 MG/ML
40 INJECTION INTRAMUSCULAR; INTRAVENOUS
Start: 2025-03-13

## 2025-03-05 RX ORDER — CEFTRIAXONE 1 G/1
2000 INJECTION, POWDER, FOR SOLUTION INTRAMUSCULAR; INTRAVENOUS DAILY
Status: SHIPPED
Start: 2025-03-05 | End: 2025-03-06

## 2025-03-05 RX ORDER — DIPHENHYDRAMINE HYDROCHLORIDE 50 MG/ML
50 INJECTION, SOLUTION INTRAMUSCULAR; INTRAVENOUS
Start: 2025-03-13

## 2025-03-05 NOTE — PROGRESS NOTES
[facility] Infectious Disease  Followup Visit        Assessment:   Recurrent drainage left thigh wound, secondary to retained infected sutures and ischial osteomyelitis, bilateral! See MRI  At some point with infected suture with staph lugdunensis  Atypical infections to be considered include mycobacterial and fungal  No signs of osteomyelitis on older MRI 2024     history of bilateral hamstring repairs at the ischial tuberosity. JERAD Nuñez within last 4 years  Received a course of Iv abx in Sep 2023 , Inter[facility], 6 weeks  In May 2024 the left wound started draining again had another debridement and a course of antibiotics>>negative cultures  On 10/25/24 he had the following procedure , when Staph lugdunesis grew from nonabsorbable sutures    2/27 #1 removal of foreign body from left ischial region with excisional debridement of skin subcutaneous tissue and muscle, #2 removal of foreign body from right pelvis bone with debridement of skin subcutaneous tissue muscle and bone, DR VELASQUEZ   In the process of debriding this tract with a rongeur I removed a tangle of suture that included a large knot and suture anchor. We cultured pieces of this material and sent 1 piece to pathology   The tissue beneath it was also friable and had the appearance of infected fat and muscle.   All THREE CULTURES GREW     2+ Staphylococcus lugdunensis Abnormal         No bone PATH sent  Only foregn bodies sent to path    Plan:       Pt on amoxicillin/clavulanic acid: discontinue  as he starts IV abx  Will need 6 weeks of IV abx  Will need picc   All Side effects and potential toxicities of antibiotic, including but not limited to nephrotoxicity, hepatotoxicity, diarrhea , allergic reactions and rash, line related infection and clots ,etc. have been explained to the patient and he agrees to proceed.  Followup 4 weeks    Giovanni Borges M.D.              Present Illness:     60 yo male presenting for clinic followup , hx of Recurrent drainage  left thigh wound, infected suture material with staph lugdensis and hx of OPAT late 2023  He is now postop, see above, on oral abx -from photo he provided looks like augmentin-(surgery done at surgery ctr outpt procedure)    He's doing well postop, no abnormal swelling    We requested MRI:    Radiology personally reviewed  IMPRESSION:  1.  Postoperative changes of repair of bilateral proximal hamstrings tendons at the ischial tuberosity attachments.  2.  Interval increase in size of an irregular peripherally enhancing fluid collection surrounding the partially torn fibers of the right proximal hamstrings tendons, which communicates with an additional fluid collection that tracks along the   semimembranosus and semitendinosus muscle bellies in the proximal and mid thigh and extends to the skin surface along the inferior right gluteal fold, concerning for multifocal abscesses.  3.  Similar appearance and extent of a small sinus tract extending from the posterior aspect of the left proximal thigh to the left ischial tuberosity.  4.  Increased confluent T1 hypointense marrow replacement and enhancing edema in the left ischial tuberosity, concerning for osteomyelitis.  5.  Similar extent of osteolysis involving the right ischial tuberosity with slightly increased surrounding bone marrow edema and enhancement, which may reflect reactive change although early developing osteomyelitis is not excluded.  Review of Systems  Performed and all negative except as mentioned above.    Past medical, family, and social history reviewed, unchanged from before.        Physical Exam:   Gen. appearance nontoxic  Eyes no conjunctivitis or icterus  Neck no stiffness   Heart  no edema  Lungs no sob    Extremities no synovitis, trace edema  Posterior thighs show surgical locations , upper, clean dressings  Skin  no rash or emboli  Neurologic alert oriented no focal deficits        DATA   No results found for any visits on  03/05/25.  Collected 2/27/2025  1:47 PM       Status: Final result       Visible to patient: Yes (seen)    Specimen Information: Buttock, Right; Tissue   0 Result Notes  Culture 1+ Staphylococcus lugdunensis Abnormal         Resulting Agency: [facility]     Susceptibility     Staphylococcus lugdunensis     NARDA     Ciprofloxacin <=0.5 ug/mL Susceptible *     Clindamycin 0.25 ug/mL Susceptible     Daptomycin 0.25 ug/mL Susceptible     Doxycycline <=0.5 ug/mL Susceptible     Erythromycin <=0.25 ug/mL Susceptible     Gentamicin <=0.5 ug/mL Susceptible     Inducible macrolide resistance test Negative ug/mL Negative *     Levofloxacin 0.25 ug/mL Susceptible *     Linezolid 1 ug/mL Susceptible *     Nitrofurantoin <=16 ug/mL Susceptible *     Oxacillin 2 ug/mL Susceptible 1     Rifampin <=0.5 ug/mL Susceptible *     Tetracycline <=1 ug/mL Susceptible     Tigecycline <=0.12 ug/mL Susceptible *     Trimethoprim/Sulfamethoxazole <=0.5/9.5 u... Susceptible     Vancomycin <=0.5 ug/mL Susceptible           Giovanni Borges MD, MD

## 2025-03-06 RX ORDER — CEFTRIAXONE SODIUM 10 G/100ML
2000 INJECTION, POWDER, FOR SOLUTION INTRAVENOUS EVERY 24 HOURS
Qty: 820 ML | Refills: 0 | Status: DISPENSED | OUTPATIENT
Start: 2025-03-07 | End: 2025-04-17

## 2025-03-07 ENCOUNTER — HOSPITAL ENCOUNTER (OUTPATIENT)
Dept: GENERAL RADIOLOGY | Facility: CLINIC | Age: 60
Discharge: HOME | End: 2025-03-07
Attending: INTERNAL MEDICINE
Payer: COMMERCIAL

## 2025-03-07 ENCOUNTER — HOME INFUSION (OUTPATIENT)
Dept: HOME HEALTH SERVICES | Facility: HOME HEALTH | Age: 60
End: 2025-03-07
Payer: COMMERCIAL

## 2025-03-07 ENCOUNTER — HOME INFUSION BILLING (OUTPATIENT)
Dept: HOME HEALTH SERVICES | Facility: HOME HEALTH | Age: 60
End: 2025-03-07
Payer: COMMERCIAL

## 2025-03-07 DIAGNOSIS — M86.9 OSTEOMYELITIS, PELVIS (H): ICD-10-CM

## 2025-03-07 PROCEDURE — A4245 ALCOHOL WIPES PER BOX: HCPCS

## 2025-03-07 PROCEDURE — A9270 NON-COVERED ITEM OR SERVICE: HCPCS

## 2025-03-07 PROCEDURE — 999N000065 XR CHEST PORT 1 VIEW

## 2025-03-07 PROCEDURE — A4452 WATERPROOF TAPE: HCPCS

## 2025-03-08 PROCEDURE — S9500 HIT ANTIBIOTIC Q24H DIEM: HCPCS

## 2025-03-09 PROCEDURE — S9500 HIT ANTIBIOTIC Q24H DIEM: HCPCS

## 2025-03-10 ENCOUNTER — HOME INFUSION BILLING (OUTPATIENT)
Dept: HOME HEALTH SERVICES | Facility: HOME HEALTH | Age: 60
End: 2025-03-10
Payer: COMMERCIAL

## 2025-03-10 ENCOUNTER — MYC MEDICAL ADVICE (OUTPATIENT)
Dept: ORTHOPEDICS | Facility: CLINIC | Age: 60
End: 2025-03-10
Payer: COMMERCIAL

## 2025-03-10 PROCEDURE — S9500 HIT ANTIBIOTIC Q24H DIEM: HCPCS

## 2025-03-10 PROCEDURE — A9270 NON-COVERED ITEM OR SERVICE: HCPCS

## 2025-03-11 ENCOUNTER — HOME CARE VISIT (OUTPATIENT)
Dept: HOME HEALTH SERVICES | Facility: HOME HEALTH | Age: 60
End: 2025-03-11
Payer: COMMERCIAL

## 2025-03-11 ENCOUNTER — LAB REQUISITION (OUTPATIENT)
Dept: LAB | Facility: CLINIC | Age: 60
End: 2025-03-11
Payer: COMMERCIAL

## 2025-03-11 VITALS
DIASTOLIC BLOOD PRESSURE: 76 MMHG | OXYGEN SATURATION: 94 % | RESPIRATION RATE: 16 BRPM | HEART RATE: 70 BPM | SYSTOLIC BLOOD PRESSURE: 118 MMHG | TEMPERATURE: 97.7 F

## 2025-03-11 DIAGNOSIS — M86.9 OSTEOMYELITIS, UNSPECIFIED (H): ICD-10-CM

## 2025-03-11 LAB
ALBUMIN SERPL BCG-MCNC: 4.2 G/DL (ref 3.5–5.2)
ALP SERPL-CCNC: 112 U/L (ref 40–150)
ALT SERPL W P-5'-P-CCNC: 25 U/L (ref 0–70)
ANION GAP SERPL CALCULATED.3IONS-SCNC: 12 MMOL/L (ref 7–15)
AST SERPL W P-5'-P-CCNC: 30 U/L (ref 0–45)
BASOPHILS # BLD AUTO: 0 10E3/UL (ref 0–0.2)
BASOPHILS NFR BLD AUTO: 1 %
BILIRUB SERPL-MCNC: 0.2 MG/DL
BUN SERPL-MCNC: 21.3 MG/DL (ref 8–23)
CALCIUM SERPL-MCNC: 9.4 MG/DL (ref 8.8–10.4)
CHLORIDE SERPL-SCNC: 104 MMOL/L (ref 98–107)
CREAT SERPL-MCNC: 0.7 MG/DL (ref 0.67–1.17)
CRP SERPL-MCNC: 4 MG/L
EGFRCR SERPLBLD CKD-EPI 2021: >90 ML/MIN/1.73M2
EOSINOPHIL # BLD AUTO: 0.1 10E3/UL (ref 0–0.7)
EOSINOPHIL NFR BLD AUTO: 1 %
ERYTHROCYTE [DISTWIDTH] IN BLOOD BY AUTOMATED COUNT: 13.7 % (ref 10–15)
GLUCOSE SERPL-MCNC: 111 MG/DL (ref 70–99)
HCO3 SERPL-SCNC: 25 MMOL/L (ref 22–29)
HCT VFR BLD AUTO: 39.8 % (ref 40–53)
HGB BLD-MCNC: 13.2 G/DL (ref 13.3–17.7)
IMM GRANULOCYTES # BLD: 0 10E3/UL
IMM GRANULOCYTES NFR BLD: 0 %
LYMPHOCYTES # BLD AUTO: 1.3 10E3/UL (ref 0.8–5.3)
LYMPHOCYTES NFR BLD AUTO: 16 %
MCH RBC QN AUTO: 28.9 PG (ref 26.5–33)
MCHC RBC AUTO-ENTMCNC: 33.2 G/DL (ref 31.5–36.5)
MCV RBC AUTO: 87 FL (ref 78–100)
MONOCYTES # BLD AUTO: 0.4 10E3/UL (ref 0–1.3)
MONOCYTES NFR BLD AUTO: 5 %
NEUTROPHILS # BLD AUTO: 6.1 10E3/UL (ref 1.6–8.3)
NEUTROPHILS NFR BLD AUTO: 77 %
NRBC # BLD AUTO: 0 10E3/UL
NRBC BLD AUTO-RTO: 0 /100
PLATELET # BLD AUTO: 253 10E3/UL (ref 150–450)
POTASSIUM SERPL-SCNC: 4.1 MMOL/L (ref 3.4–5.3)
PROT SERPL-MCNC: 7 G/DL (ref 6.4–8.3)
RBC # BLD AUTO: 4.57 10E6/UL (ref 4.4–5.9)
SODIUM SERPL-SCNC: 141 MMOL/L (ref 135–145)
WBC # BLD AUTO: 7.9 10E3/UL (ref 4–11)

## 2025-03-11 PROCEDURE — 82310 ASSAY OF CALCIUM: CPT | Performed by: INTERNAL MEDICINE

## 2025-03-11 PROCEDURE — 82040 ASSAY OF SERUM ALBUMIN: CPT | Performed by: INTERNAL MEDICINE

## 2025-03-11 PROCEDURE — 99601 HOME NFS VISIT <2 HRS: CPT

## 2025-03-11 PROCEDURE — 86140 C-REACTIVE PROTEIN: CPT | Performed by: INTERNAL MEDICINE

## 2025-03-11 PROCEDURE — S9500 HIT ANTIBIOTIC Q24H DIEM: HCPCS

## 2025-03-11 PROCEDURE — 85014 HEMATOCRIT: CPT | Performed by: INTERNAL MEDICINE

## 2025-03-11 PROCEDURE — 85004 AUTOMATED DIFF WBC COUNT: CPT | Performed by: INTERNAL MEDICINE

## 2025-03-11 PROCEDURE — 85041 AUTOMATED RBC COUNT: CPT | Performed by: INTERNAL MEDICINE

## 2025-03-11 NOTE — PROGRESS NOTES
"Chief Complaint: wound check  DATE OF SURGERY: 2/27/2025  PREOPERATIVE DIAGNOSIS: #1 infected foreign body left ischial region, #2 infected foreign body right ischial region with possible osteomyelitis  POSTOPERATIVE DIAGNOSIS: #1 infected foreign body left ischial region, #2 infected foreign body right ischial region with likely osteomyelitis  PROCEDURE: #1 removal of foreign body from left ischial region with excisional debridement of skin subcutaneous tissue and muscle, #2 removal of foreign body from right pelvis bone with debridement of skin subcutaneous tissue muscle and bone  SURGEON: Ronald Silva MD     HPI: Tee is a 59 year old man here 2 weeks s/p above procedure.  Patient reports he had some mild drainage from the wounds the first week, but now they have been dry.  He is keeping them covered with a bandage.  He has showered over them.  He is trying to avoid prolonged sitting.  He has been on IV Rocephin now for about a week.  He is tolerating that well. He reports he has not been running but he is walking 2 hours a day for exercise. No other concerns.    Physical Exam: Tee is a 59 year old man who is alert and oriented and in no distress.  He moves gingerly when going from sit to stand and stand to sit.  His posterior thigh incisions are completely healed with no active drainage.  He has some dry, irritated skin surrounding the incisions, but no sign of infection. Minimal swelling.    Pathology: A(1). Buttock, Left, Left Ischial Foreign Body:  The specimen is received in formalin with proper patient identification, labeled \"left ischial foreign body\".  The specimen consists of a 1.5 cm in length by 0.2 cm in diameter white string with blue striping.  There is a 0.5 cm in length branching area.  No attached soft tissue is identified.  Gross diagnosis only.  No sections are submitted.  Gross photographs are taken.       B(2). Buttock, Right, Right Ischial Foreign Body:  The specimen is received " "in formalin with proper patient identification, labeled \"right ischial foreign body\".  The specimen consists of white strings with blue striping averaging 2.3 cm in length and each 0.1 cm in diameter which are connected to a 0.9 x 0.5 x 0.5 cm firm cylindrical synthetic area.  Surrounding this cylindrical portion is a scant amount of pink-red fibrous cauterized tissue.  Gross diagnosis only.  No sections are submitted.  Gross photographs are taken.      2/27/25  Culture 1+ Staphylococcus lugdunensis Abnormal       Susceptible to all antibiotics tested    CRP Inflammation   Date Value Ref Range Status   03/11/2025 4.00 <5.00 mg/L Final      Impression: 59 year old man s/p debridement and foreign body removal bilateral proximal hamstring insertions, now on IV Rocephin, improving    Plan: Patient can use aquaphor on the skin around the wounds.  Cover as needed for comfort.  No soaking for 2 weeks.  Avoid prolonged sitting for another 2 weeks or as comfort allows.  No running, gentle stretching only.  Follow with ID as directed.  Follow-up with Dr. Silva in 6-8 weeks for check of his progress.  He agrees with the plan and all questions answered.    "

## 2025-03-11 NOTE — PROGRESS NOTES
Nursing Visit Note:  Nurse visit today for SOC, IV therapy teach, lab draw and GA  for Reji Solorzano.     present during visit today: Not Applicable.    Intravenous Access:  PICC.    Education Provided:     Patient Education focused on IV push antibiotic therapy administration, SAS procedure, Weekly dressing change and lab draw, s/sx of infection, showering, prn use of clamp, when to seek medical attention, [facility] 24/7 availability for any questions or concerns. .     Note: Patient is A/O x4, pleasant and cooperative during visit. Patient reported he is not new to antibiotic use but has not done an IV push. Patient reported some itching around PICC line dressing, dressing change with [id number] and lab draw per orders, patient tolerated well. Ontime delivery called for ordered lab  and drop off at [facility] lab, tracking #[id number]. Patient reported good appetite and fluid intake. Denies any bowel or bladder concerns. Denies any pain, falls or medication changes. Patient denies any questions or concerns.     Saline administered: 40 (ml)    Supply Check:   Does the patient have all the supplies they need for the next visit?  Yes    Next visit plan: RN revisit for CLC, labs draw and HA     Jade Hernandez, RN 3/11/2025

## 2025-03-12 ENCOUNTER — OFFICE VISIT (OUTPATIENT)
Dept: ORTHOPEDICS | Facility: CLINIC | Age: 60
End: 2025-03-12
Payer: COMMERCIAL

## 2025-03-12 ENCOUNTER — HOME INFUSION BILLING (OUTPATIENT)
Dept: HOME HEALTH SERVICES | Facility: HOME HEALTH | Age: 60
End: 2025-03-12
Payer: COMMERCIAL

## 2025-03-12 DIAGNOSIS — M86.9 OSTEOMYELITIS, PELVIS (H): ICD-10-CM

## 2025-03-12 DIAGNOSIS — T81.49XA WOUND INFECTION AFTER SURGERY: Primary | ICD-10-CM

## 2025-03-12 PROCEDURE — A4245 ALCOHOL WIPES PER BOX: HCPCS

## 2025-03-12 PROCEDURE — A6257 TRANSPARENT FILM <= 16 SQ IN: HCPCS

## 2025-03-12 PROCEDURE — 99024 POSTOP FOLLOW-UP VISIT: CPT | Performed by: PHYSICIAN ASSISTANT

## 2025-03-12 PROCEDURE — S9500 HIT ANTIBIOTIC Q24H DIEM: HCPCS

## 2025-03-12 PROCEDURE — A4247 BETADINE/IODINE SWABS/WIPES: HCPCS

## 2025-03-12 NOTE — NURSING NOTE
Reason For Visit:   Chief Complaint   Patient presents with    Surgical Followup     DOS 2/27/25 S/P Left side posterior hip foreign body removal and soft tissue debridement, right side posterior hip foreign body removal and bone debridement -       There were no vitals taken for this visit.    Pain Assessment  Patient Currently in Pain: Sangeeta Ledezma LPN

## 2025-03-12 NOTE — LETTER
"3/12/2025      Reji Solorzano  [address] [address]      Dear Colleague,    Thank you for referring your patient, Reji Solorzano, to the [facility] [place]. Please see a copy of my visit note below.    Chief Complaint: wound check  DATE OF SURGERY: 2/27/2025  PREOPERATIVE DIAGNOSIS: #1 infected foreign body left ischial region, #2 infected foreign body right ischial region with possible osteomyelitis  POSTOPERATIVE DIAGNOSIS: #1 infected foreign body left ischial region, #2 infected foreign body right ischial region with likely osteomyelitis  PROCEDURE: #1 removal of foreign body from left ischial region with excisional debridement of skin subcutaneous tissue and muscle, #2 removal of foreign body from right pelvis bone with debridement of skin subcutaneous tissue muscle and bone  SURGEON: Ronald Silva MD     HPI: Tee is a 59 year old man here 2 weeks s/p above procedure.  Patient reports he had some mild drainage from the wounds the first week, but now they have been dry.  He is keeping them covered with a bandage.  He has showered over them.  He is trying to avoid prolonged sitting.  He has been on IV Rocephin now for about a week.  He is tolerating that well. He reports he has not been running but he is walking 2 hours a day for exercise. No other concerns.    Physical Exam: Tee is a 59 year old man who is alert and oriented and in no distress.  He moves gingerly when going from sit to stand and stand to sit.  His posterior thigh incisions are completely healed with no active drainage.  He has some dry, irritated skin surrounding the incisions, but no sign of infection. Minimal swelling.    Pathology: A(1). Buttock, Left, Left Ischial Foreign Body:  The specimen is received in formalin with proper patient identification, labeled \"left ischial foreign body\".  The specimen consists of a 1.5 cm in length by 0.2 cm in diameter white string with blue striping. " " There is a 0.5 cm in length branching area.  No attached soft tissue is identified.  Gross diagnosis only.  No sections are submitted.  Gross photographs are taken.       B(2). Buttock, Right, Right Ischial Foreign Body:  The specimen is received in formalin with proper patient identification, labeled \"right ischial foreign body\".  The specimen consists of white strings with blue striping averaging 2.3 cm in length and each 0.1 cm in diameter which are connected to a 0.9 x 0.5 x 0.5 cm firm cylindrical synthetic area.  Surrounding this cylindrical portion is a scant amount of pink-red fibrous cauterized tissue.  Gross diagnosis only.  No sections are submitted.  Gross photographs are taken.      2/27/25  Culture 1+ Staphylococcus lugdunensis Abnormal       Susceptible to all antibiotics tested    CRP Inflammation   Date Value Ref Range Status   03/11/2025 4.00 <5.00 mg/L Final      Impression: 59 year old man s/p debridement and foreign body removal bilateral proximal hamstring insertions, now on IV Rocephin, improving    Plan: Patient can use aquaphor on the skin around the wounds.  Cover as needed for comfort.  No soaking for 2 weeks.  Avoid prolonged sitting for another 2 weeks or as comfort allows.  No running, gentle stretching only.  Follow with ID as directed.  Follow-up with Dr. Silva in 6-8 weeks for check of his progress.  He agrees with the plan and all questions answered.      Again, thank you for allowing me to participate in the care of your patient.        Sincerely,        Lavinia Oakley PA-C    Electronically signed"

## 2025-03-13 PROCEDURE — S9500 HIT ANTIBIOTIC Q24H DIEM: HCPCS

## 2025-03-14 ENCOUNTER — HOME INFUSION (OUTPATIENT)
Dept: HOME HEALTH SERVICES | Facility: HOME HEALTH | Age: 60
End: 2025-03-14
Payer: COMMERCIAL

## 2025-03-14 PROCEDURE — S9500 HIT ANTIBIOTIC Q24H DIEM: HCPCS

## 2025-03-15 PROCEDURE — S9500 HIT ANTIBIOTIC Q24H DIEM: HCPCS

## 2025-03-16 PROCEDURE — S9500 HIT ANTIBIOTIC Q24H DIEM: HCPCS

## 2025-03-17 ENCOUNTER — HOME INFUSION BILLING (OUTPATIENT)
Dept: HOME HEALTH SERVICES | Facility: HOME HEALTH | Age: 60
End: 2025-03-17
Payer: COMMERCIAL

## 2025-03-17 PROCEDURE — A6257 TRANSPARENT FILM <= 16 SQ IN: HCPCS

## 2025-03-17 PROCEDURE — S9500 HIT ANTIBIOTIC Q24H DIEM: HCPCS

## 2025-03-17 PROCEDURE — A4245 ALCOHOL WIPES PER BOX: HCPCS

## 2025-03-17 PROCEDURE — A9270 NON-COVERED ITEM OR SERVICE: HCPCS

## 2025-03-17 PROCEDURE — A4247 BETADINE/IODINE SWABS/WIPES: HCPCS

## 2025-03-18 ENCOUNTER — LAB REQUISITION (OUTPATIENT)
Dept: LAB | Facility: HOSPITAL | Age: 60
End: 2025-03-18
Payer: COMMERCIAL

## 2025-03-18 ENCOUNTER — HOME CARE VISIT (OUTPATIENT)
Dept: HOME HEALTH SERVICES | Facility: HOME HEALTH | Age: 60
End: 2025-03-18
Payer: COMMERCIAL

## 2025-03-18 VITALS
HEART RATE: 81 BPM | RESPIRATION RATE: 16 BRPM | DIASTOLIC BLOOD PRESSURE: 82 MMHG | TEMPERATURE: 98.1 F | SYSTOLIC BLOOD PRESSURE: 120 MMHG | OXYGEN SATURATION: 96 %

## 2025-03-18 DIAGNOSIS — M86.9 OSTEOMYELITIS, UNSPECIFIED (H): ICD-10-CM

## 2025-03-18 LAB
ALBUMIN SERPL BCG-MCNC: 4.2 G/DL (ref 3.5–5.2)
ALP SERPL-CCNC: 102 U/L (ref 40–150)
ALT SERPL W P-5'-P-CCNC: 20 U/L (ref 0–70)
ANION GAP SERPL CALCULATED.3IONS-SCNC: 11 MMOL/L (ref 7–15)
AST SERPL W P-5'-P-CCNC: 28 U/L (ref 0–45)
BASOPHILS # BLD AUTO: 0 10E3/UL (ref 0–0.2)
BASOPHILS NFR BLD AUTO: 1 %
BILIRUB SERPL-MCNC: 0.3 MG/DL
BUN SERPL-MCNC: 20.9 MG/DL (ref 8–23)
CALCIUM SERPL-MCNC: 9.5 MG/DL (ref 8.8–10.4)
CHLORIDE SERPL-SCNC: 106 MMOL/L (ref 98–107)
CREAT SERPL-MCNC: 0.66 MG/DL (ref 0.67–1.17)
CRP SERPL-MCNC: <3 MG/L
EGFRCR SERPLBLD CKD-EPI 2021: >90 ML/MIN/1.73M2
EOSINOPHIL # BLD AUTO: 0.1 10E3/UL (ref 0–0.7)
EOSINOPHIL NFR BLD AUTO: 1 %
ERYTHROCYTE [DISTWIDTH] IN BLOOD BY AUTOMATED COUNT: 13.8 % (ref 10–15)
GLUCOSE SERPL-MCNC: 110 MG/DL (ref 70–99)
HCO3 SERPL-SCNC: 26 MMOL/L (ref 22–29)
HCT VFR BLD AUTO: 40.5 % (ref 40–53)
HGB BLD-MCNC: 13.2 G/DL (ref 13.3–17.7)
IMM GRANULOCYTES # BLD: 0 10E3/UL
IMM GRANULOCYTES NFR BLD: 0 %
LYMPHOCYTES # BLD AUTO: 1.2 10E3/UL (ref 0.8–5.3)
LYMPHOCYTES NFR BLD AUTO: 17 %
MCH RBC QN AUTO: 28 PG (ref 26.5–33)
MCHC RBC AUTO-ENTMCNC: 32.6 G/DL (ref 31.5–36.5)
MCV RBC AUTO: 86 FL (ref 78–100)
MONOCYTES # BLD AUTO: 0.3 10E3/UL (ref 0–1.3)
MONOCYTES NFR BLD AUTO: 5 %
NEUTROPHILS # BLD AUTO: 5.2 10E3/UL (ref 1.6–8.3)
NEUTROPHILS NFR BLD AUTO: 76 %
NRBC # BLD AUTO: 0 10E3/UL
NRBC BLD AUTO-RTO: 0 /100
PLATELET # BLD AUTO: 216 10E3/UL (ref 150–450)
POTASSIUM SERPL-SCNC: 4.1 MMOL/L (ref 3.4–5.3)
PROT SERPL-MCNC: 6.6 G/DL (ref 6.4–8.3)
RBC # BLD AUTO: 4.71 10E6/UL (ref 4.4–5.9)
SODIUM SERPL-SCNC: 143 MMOL/L (ref 135–145)
WBC # BLD AUTO: 6.9 10E3/UL (ref 4–11)

## 2025-03-18 PROCEDURE — 84520 ASSAY OF UREA NITROGEN: CPT | Performed by: INTERNAL MEDICINE

## 2025-03-18 PROCEDURE — 84155 ASSAY OF PROTEIN SERUM: CPT | Performed by: INTERNAL MEDICINE

## 2025-03-18 PROCEDURE — 99601 HOME NFS VISIT <2 HRS: CPT

## 2025-03-18 PROCEDURE — 85025 COMPLETE CBC W/AUTO DIFF WBC: CPT | Performed by: INTERNAL MEDICINE

## 2025-03-18 PROCEDURE — S9500 HIT ANTIBIOTIC Q24H DIEM: HCPCS

## 2025-03-18 PROCEDURE — 86140 C-REACTIVE PROTEIN: CPT | Performed by: INTERNAL MEDICINE

## 2025-03-18 NOTE — PROGRESS NOTES
Nursing Visit Note:  Nurse visit today for CLC and LAB draw for Reji Solorzano.     present during visit today: Not Applicable.    Intravenous Access:  Labs drawn without difficulty. and PICC.    Lab-Only Nursing Note    Labs obtained via PICC    Time Specimen drawn: 1330    Last dose (if applicable): No    Facility sent to: [facility] Tracking number: [id number]    Note: patient reports no pain. Infusions are going well. Denies any side effects. Mild rash noted under Picc line dressing. Alcohol and Betadine no skin prep and IV 3000 used for dressing change to picc line today.     Saline administered: 30 (ml)    Supply Check:   Does the patient have all the supplies they need for the next visit?  Yes    Next visit plan: 3/25/25 Tuesday at 1 PM for CLC and labs    Dominga Metz RN 3/18/2025

## 2025-03-19 ENCOUNTER — HOME INFUSION (OUTPATIENT)
Dept: HOME HEALTH SERVICES | Facility: HOME HEALTH | Age: 60
End: 2025-03-19
Payer: COMMERCIAL

## 2025-03-19 PROCEDURE — S9500 HIT ANTIBIOTIC Q24H DIEM: HCPCS

## 2025-03-20 PROCEDURE — S9500 HIT ANTIBIOTIC Q24H DIEM: HCPCS

## 2025-03-21 PROCEDURE — S9500 HIT ANTIBIOTIC Q24H DIEM: HCPCS

## 2025-03-22 PROCEDURE — S9500 HIT ANTIBIOTIC Q24H DIEM: HCPCS

## 2025-03-23 PROCEDURE — S9500 HIT ANTIBIOTIC Q24H DIEM: HCPCS

## 2025-03-24 ENCOUNTER — HOME INFUSION BILLING (OUTPATIENT)
Dept: HOME HEALTH SERVICES | Facility: HOME HEALTH | Age: 60
End: 2025-03-24
Payer: COMMERCIAL

## 2025-03-24 PROCEDURE — A4245 ALCOHOL WIPES PER BOX: HCPCS

## 2025-03-24 PROCEDURE — A6257 TRANSPARENT FILM <= 16 SQ IN: HCPCS

## 2025-03-24 PROCEDURE — A9270 NON-COVERED ITEM OR SERVICE: HCPCS

## 2025-03-24 PROCEDURE — S9500 HIT ANTIBIOTIC Q24H DIEM: HCPCS

## 2025-03-24 PROCEDURE — A4247 BETADINE/IODINE SWABS/WIPES: HCPCS

## 2025-03-25 ENCOUNTER — HOME CARE VISIT (OUTPATIENT)
Dept: HOME HEALTH SERVICES | Facility: HOME HEALTH | Age: 60
End: 2025-03-25
Payer: COMMERCIAL

## 2025-03-25 ENCOUNTER — LAB REQUISITION (OUTPATIENT)
Dept: LAB | Facility: HOSPITAL | Age: 60
End: 2025-03-25
Payer: COMMERCIAL

## 2025-03-25 VITALS
SYSTOLIC BLOOD PRESSURE: 128 MMHG | HEART RATE: 72 BPM | TEMPERATURE: 98.2 F | RESPIRATION RATE: 16 BRPM | OXYGEN SATURATION: 97 % | DIASTOLIC BLOOD PRESSURE: 78 MMHG

## 2025-03-25 DIAGNOSIS — M86.9 OSTEOMYELITIS, UNSPECIFIED (H): ICD-10-CM

## 2025-03-25 LAB
ALBUMIN SERPL BCG-MCNC: 4.3 G/DL (ref 3.5–5.2)
ALP SERPL-CCNC: 96 U/L (ref 40–150)
ALT SERPL W P-5'-P-CCNC: 20 U/L (ref 0–70)
ANION GAP SERPL CALCULATED.3IONS-SCNC: 11 MMOL/L (ref 7–15)
AST SERPL W P-5'-P-CCNC: 29 U/L (ref 0–45)
BASOPHILS # BLD AUTO: 0 10E3/UL (ref 0–0.2)
BASOPHILS NFR BLD AUTO: 0 %
BILIRUB SERPL-MCNC: 0.3 MG/DL
BUN SERPL-MCNC: 18.7 MG/DL (ref 8–23)
CALCIUM SERPL-MCNC: 9.4 MG/DL (ref 8.8–10.4)
CHLORIDE SERPL-SCNC: 102 MMOL/L (ref 98–107)
CREAT SERPL-MCNC: 0.64 MG/DL (ref 0.67–1.17)
CRP SERPL-MCNC: <3 MG/L
EGFRCR SERPLBLD CKD-EPI 2021: >90 ML/MIN/1.73M2
EOSINOPHIL # BLD AUTO: 0.1 10E3/UL (ref 0–0.7)
EOSINOPHIL NFR BLD AUTO: 1 %
ERYTHROCYTE [DISTWIDTH] IN BLOOD BY AUTOMATED COUNT: 13.9 % (ref 10–15)
GLUCOSE SERPL-MCNC: 105 MG/DL (ref 70–99)
HCO3 SERPL-SCNC: 26 MMOL/L (ref 22–29)
HCT VFR BLD AUTO: 41.2 % (ref 40–53)
HGB BLD-MCNC: 13.5 G/DL (ref 13.3–17.7)
IMM GRANULOCYTES # BLD: 0 10E3/UL
IMM GRANULOCYTES NFR BLD: 0 %
LYMPHOCYTES # BLD AUTO: 1.2 10E3/UL (ref 0.8–5.3)
LYMPHOCYTES NFR BLD AUTO: 19 %
MCH RBC QN AUTO: 28.2 PG (ref 26.5–33)
MCHC RBC AUTO-ENTMCNC: 32.8 G/DL (ref 31.5–36.5)
MCV RBC AUTO: 86 FL (ref 78–100)
MONOCYTES # BLD AUTO: 0.3 10E3/UL (ref 0–1.3)
MONOCYTES NFR BLD AUTO: 5 %
NEUTROPHILS # BLD AUTO: 4.6 10E3/UL (ref 1.6–8.3)
NEUTROPHILS NFR BLD AUTO: 74 %
NRBC # BLD AUTO: 0 10E3/UL
NRBC BLD AUTO-RTO: 0 /100
PLATELET # BLD AUTO: 191 10E3/UL (ref 150–450)
POTASSIUM SERPL-SCNC: 3.8 MMOL/L (ref 3.4–5.3)
PROT SERPL-MCNC: 6.6 G/DL (ref 6.4–8.3)
RBC # BLD AUTO: 4.79 10E6/UL (ref 4.4–5.9)
SODIUM SERPL-SCNC: 139 MMOL/L (ref 135–145)
WBC # BLD AUTO: 6.2 10E3/UL (ref 4–11)

## 2025-03-25 PROCEDURE — S9500 HIT ANTIBIOTIC Q24H DIEM: HCPCS

## 2025-03-25 PROCEDURE — 99601 HOME NFS VISIT <2 HRS: CPT

## 2025-03-25 PROCEDURE — 80051 ELECTROLYTE PANEL: CPT | Performed by: INTERNAL MEDICINE

## 2025-03-25 PROCEDURE — 86140 C-REACTIVE PROTEIN: CPT | Performed by: INTERNAL MEDICINE

## 2025-03-25 PROCEDURE — 85025 COMPLETE CBC W/AUTO DIFF WBC: CPT | Performed by: INTERNAL MEDICINE

## 2025-03-25 PROCEDURE — 84155 ASSAY OF PROTEIN SERUM: CPT | Performed by: INTERNAL MEDICINE

## 2025-03-25 NOTE — PROGRESS NOTES
Nursing Visit Note:  Nurse visit today for CLC and labs for Reji Thomas     present during visit today: Not Applicable.    Intravenous Access:  PICC.    Lab-Only Nursing Note    Labs obtained via PICC    Time Specimen drawn: CMP, CRP, CBC    Last dose (if applicable): Yes: Last dose date: n/a  Last dose start time: n/a  Last dose end time: n/a    Facility sent to: [facility] Tracking number: [id number]    Note: CLC and labs, rash under PICC dressing improving. Continue with alcohol/betadine and IV 3000. No new concerns at this time     Saline administered: 20 (ml)    Supply Check:   Does the patient have all the supplies they need for the next visit?  No, Order placed for IV 3000    Next visit plan: 4/1/25 at 1300 for CLC and labs    Perla Mahmood, RN 3/25/2025

## 2025-03-26 PROCEDURE — S9500 HIT ANTIBIOTIC Q24H DIEM: HCPCS

## 2025-03-27 PROCEDURE — S9500 HIT ANTIBIOTIC Q24H DIEM: HCPCS

## 2025-03-28 ENCOUNTER — HOME INFUSION (OUTPATIENT)
Dept: HOME HEALTH SERVICES | Facility: HOME HEALTH | Age: 60
End: 2025-03-28
Payer: COMMERCIAL

## 2025-03-28 PROCEDURE — S9500 HIT ANTIBIOTIC Q24H DIEM: HCPCS

## 2025-03-29 PROCEDURE — S9500 HIT ANTIBIOTIC Q24H DIEM: HCPCS

## 2025-03-30 PROCEDURE — S9500 HIT ANTIBIOTIC Q24H DIEM: HCPCS

## 2025-03-31 ENCOUNTER — HOME INFUSION BILLING (OUTPATIENT)
Dept: HOME HEALTH SERVICES | Facility: HOME HEALTH | Age: 60
End: 2025-03-31
Payer: COMMERCIAL

## 2025-03-31 PROCEDURE — A4247 BETADINE/IODINE SWABS/WIPES: HCPCS

## 2025-03-31 PROCEDURE — A9270 NON-COVERED ITEM OR SERVICE: HCPCS

## 2025-03-31 PROCEDURE — A4245 ALCOHOL WIPES PER BOX: HCPCS

## 2025-03-31 PROCEDURE — S9500 HIT ANTIBIOTIC Q24H DIEM: HCPCS

## 2025-04-01 ENCOUNTER — HOME CARE VISIT (OUTPATIENT)
Dept: HOME HEALTH SERVICES | Facility: HOME HEALTH | Age: 60
End: 2025-04-01
Payer: COMMERCIAL

## 2025-04-01 ENCOUNTER — LAB REQUISITION (OUTPATIENT)
Dept: LAB | Facility: HOSPITAL | Age: 60
End: 2025-04-01
Payer: COMMERCIAL

## 2025-04-01 VITALS
TEMPERATURE: 98.2 F | OXYGEN SATURATION: 97 % | SYSTOLIC BLOOD PRESSURE: 128 MMHG | DIASTOLIC BLOOD PRESSURE: 81 MMHG | RESPIRATION RATE: 16 BRPM | HEART RATE: 72 BPM

## 2025-04-01 DIAGNOSIS — M86.9 OSTEOMYELITIS, UNSPECIFIED (H): ICD-10-CM

## 2025-04-01 LAB
ALBUMIN SERPL BCG-MCNC: 4.3 G/DL (ref 3.5–5.2)
ALP SERPL-CCNC: 100 U/L (ref 40–150)
ALT SERPL W P-5'-P-CCNC: 24 U/L (ref 0–70)
ANION GAP SERPL CALCULATED.3IONS-SCNC: 10 MMOL/L (ref 7–15)
AST SERPL W P-5'-P-CCNC: 33 U/L (ref 0–45)
BASOPHILS # BLD AUTO: 0 10E3/UL (ref 0–0.2)
BASOPHILS NFR BLD AUTO: 0 %
BILIRUB SERPL-MCNC: 0.2 MG/DL
BUN SERPL-MCNC: 18.4 MG/DL (ref 8–23)
CALCIUM SERPL-MCNC: 9.4 MG/DL (ref 8.8–10.4)
CHLORIDE SERPL-SCNC: 103 MMOL/L (ref 98–107)
CREAT SERPL-MCNC: 0.62 MG/DL (ref 0.67–1.17)
CRP SERPL-MCNC: <3 MG/L
EGFRCR SERPLBLD CKD-EPI 2021: >90 ML/MIN/1.73M2
EOSINOPHIL # BLD AUTO: 0.1 10E3/UL (ref 0–0.7)
EOSINOPHIL NFR BLD AUTO: 1 %
ERYTHROCYTE [DISTWIDTH] IN BLOOD BY AUTOMATED COUNT: 14 % (ref 10–15)
GLUCOSE SERPL-MCNC: 102 MG/DL (ref 70–99)
HCO3 SERPL-SCNC: 27 MMOL/L (ref 22–29)
HCT VFR BLD AUTO: 39.6 % (ref 40–53)
HGB BLD-MCNC: 13.4 G/DL (ref 13.3–17.7)
IMM GRANULOCYTES # BLD: 0 10E3/UL
IMM GRANULOCYTES NFR BLD: 0 %
LYMPHOCYTES # BLD AUTO: 1.2 10E3/UL (ref 0.8–5.3)
LYMPHOCYTES NFR BLD AUTO: 20 %
MCH RBC QN AUTO: 28.9 PG (ref 26.5–33)
MCHC RBC AUTO-ENTMCNC: 33.8 G/DL (ref 31.5–36.5)
MCV RBC AUTO: 86 FL (ref 78–100)
MONOCYTES # BLD AUTO: 0.4 10E3/UL (ref 0–1.3)
MONOCYTES NFR BLD AUTO: 6 %
NEUTROPHILS # BLD AUTO: 4.2 10E3/UL (ref 1.6–8.3)
NEUTROPHILS NFR BLD AUTO: 72 %
NRBC # BLD AUTO: 0 10E3/UL
NRBC BLD AUTO-RTO: 0 /100
PLATELET # BLD AUTO: 160 10E3/UL (ref 150–450)
POTASSIUM SERPL-SCNC: 4.2 MMOL/L (ref 3.4–5.3)
PROT SERPL-MCNC: 6.7 G/DL (ref 6.4–8.3)
RBC # BLD AUTO: 4.63 10E6/UL (ref 4.4–5.9)
SODIUM SERPL-SCNC: 140 MMOL/L (ref 135–145)
WBC # BLD AUTO: 5.9 10E3/UL (ref 4–11)

## 2025-04-01 PROCEDURE — 85014 HEMATOCRIT: CPT | Performed by: INTERNAL MEDICINE

## 2025-04-01 PROCEDURE — 80051 ELECTROLYTE PANEL: CPT | Performed by: INTERNAL MEDICINE

## 2025-04-01 PROCEDURE — 84155 ASSAY OF PROTEIN SERUM: CPT | Performed by: INTERNAL MEDICINE

## 2025-04-01 PROCEDURE — 99601 HOME NFS VISIT <2 HRS: CPT

## 2025-04-01 PROCEDURE — 86140 C-REACTIVE PROTEIN: CPT | Performed by: INTERNAL MEDICINE

## 2025-04-01 PROCEDURE — 85004 AUTOMATED DIFF WBC COUNT: CPT | Performed by: INTERNAL MEDICINE

## 2025-04-01 PROCEDURE — S9500 HIT ANTIBIOTIC Q24H DIEM: HCPCS

## 2025-04-01 NOTE — PROGRESS NOTES
Nursing Visit Note:  Nurse visit today for clc and labs for Reji Solorzano.     present during visit today: Not Applicable.    Intravenous Access:  Labs drawn without difficulty. and PICC.    Lab-Only Nursing Note    Labs obtained via PICC    Time Specimen drawn: 1305    Last dose (if applicable): No    Facility sent to: [facility] Tracking number: na    Note: CLC completed using IV 3000, Betadine, and alcohol no skin prep, skin rash has resolved. Infusion is going well. Denies any side effects. Denies any pain.    Saline administered: 30 (ml)    Supply Check:   Does the patient have all the supplies they need for the next visit?  No, Order placed for Algidex with next delivery    Next visit plan: 4/8/25 at 1300 for clc and labs     Dominga Metz RN 4/1/2025

## 2025-04-02 PROCEDURE — S9500 HIT ANTIBIOTIC Q24H DIEM: HCPCS

## 2025-04-03 PROCEDURE — S9500 HIT ANTIBIOTIC Q24H DIEM: HCPCS

## 2025-04-04 ENCOUNTER — HOME INFUSION (OUTPATIENT)
Dept: HOME HEALTH SERVICES | Facility: HOME HEALTH | Age: 60
End: 2025-04-04
Payer: COMMERCIAL

## 2025-04-04 DIAGNOSIS — B99.9 INFECTION: ICD-10-CM

## 2025-04-04 PROCEDURE — S9500 HIT ANTIBIOTIC Q24H DIEM: HCPCS

## 2025-04-05 PROCEDURE — S9500 HIT ANTIBIOTIC Q24H DIEM: HCPCS

## 2025-04-06 PROCEDURE — S9500 HIT ANTIBIOTIC Q24H DIEM: HCPCS

## 2025-04-07 ENCOUNTER — HOME INFUSION BILLING (OUTPATIENT)
Dept: HOME HEALTH SERVICES | Facility: HOME HEALTH | Age: 60
End: 2025-04-07
Payer: COMMERCIAL

## 2025-04-07 PROCEDURE — A4245 ALCOHOL WIPES PER BOX: HCPCS

## 2025-04-07 PROCEDURE — S9500 HIT ANTIBIOTIC Q24H DIEM: HCPCS

## 2025-04-07 PROCEDURE — A9270 NON-COVERED ITEM OR SERVICE: HCPCS

## 2025-04-07 PROCEDURE — A6197 ALGINATE DRSG >16 <=48 SQ IN: HCPCS

## 2025-04-07 PROCEDURE — A4247 BETADINE/IODINE SWABS/WIPES: HCPCS

## 2025-04-08 ENCOUNTER — HOME CARE VISIT (OUTPATIENT)
Dept: HOME HEALTH SERVICES | Facility: HOME HEALTH | Age: 60
End: 2025-04-08
Payer: COMMERCIAL

## 2025-04-08 ENCOUNTER — LAB REQUISITION (OUTPATIENT)
Dept: LAB | Facility: HOSPITAL | Age: 60
End: 2025-04-08
Payer: COMMERCIAL

## 2025-04-08 VITALS
HEART RATE: 74 BPM | SYSTOLIC BLOOD PRESSURE: 147 MMHG | RESPIRATION RATE: 16 BRPM | TEMPERATURE: 97.8 F | OXYGEN SATURATION: 97 % | DIASTOLIC BLOOD PRESSURE: 90 MMHG

## 2025-04-08 DIAGNOSIS — M86.9 OSTEOMYELITIS, UNSPECIFIED (H): ICD-10-CM

## 2025-04-08 LAB
ALBUMIN SERPL BCG-MCNC: 4.3 G/DL (ref 3.5–5.2)
ALP SERPL-CCNC: 100 U/L (ref 40–150)
ALT SERPL W P-5'-P-CCNC: 25 U/L (ref 0–70)
ANION GAP SERPL CALCULATED.3IONS-SCNC: 11 MMOL/L (ref 7–15)
AST SERPL W P-5'-P-CCNC: 32 U/L (ref 0–45)
BASOPHILS # BLD AUTO: 0 10E3/UL (ref 0–0.2)
BASOPHILS NFR BLD AUTO: 1 %
BILIRUB SERPL-MCNC: 0.2 MG/DL
BUN SERPL-MCNC: 16.7 MG/DL (ref 8–23)
CALCIUM SERPL-MCNC: 9.5 MG/DL (ref 8.8–10.4)
CHLORIDE SERPL-SCNC: 105 MMOL/L (ref 98–107)
CREAT SERPL-MCNC: 0.68 MG/DL (ref 0.67–1.17)
CRP SERPL-MCNC: <3 MG/L
EGFRCR SERPLBLD CKD-EPI 2021: >90 ML/MIN/1.73M2
EOSINOPHIL # BLD AUTO: 0.1 10E3/UL (ref 0–0.7)
EOSINOPHIL NFR BLD AUTO: 1 %
ERYTHROCYTE [DISTWIDTH] IN BLOOD BY AUTOMATED COUNT: 14.1 % (ref 10–15)
GLUCOSE SERPL-MCNC: 89 MG/DL (ref 70–99)
HCO3 SERPL-SCNC: 26 MMOL/L (ref 22–29)
HCT VFR BLD AUTO: 41.8 % (ref 40–53)
HGB BLD-MCNC: 13.9 G/DL (ref 13.3–17.7)
IMM GRANULOCYTES # BLD: 0 10E3/UL
IMM GRANULOCYTES NFR BLD: 0 %
LYMPHOCYTES # BLD AUTO: 1.2 10E3/UL (ref 0.8–5.3)
LYMPHOCYTES NFR BLD AUTO: 20 %
MCH RBC QN AUTO: 28.7 PG (ref 26.5–33)
MCHC RBC AUTO-ENTMCNC: 33.3 G/DL (ref 31.5–36.5)
MCV RBC AUTO: 86 FL (ref 78–100)
MONOCYTES # BLD AUTO: 0.4 10E3/UL (ref 0–1.3)
MONOCYTES NFR BLD AUTO: 7 %
NEUTROPHILS # BLD AUTO: 4.4 10E3/UL (ref 1.6–8.3)
NEUTROPHILS NFR BLD AUTO: 71 %
NRBC # BLD AUTO: 0 10E3/UL
NRBC BLD AUTO-RTO: 0 /100
PLATELET # BLD AUTO: 182 10E3/UL (ref 150–450)
POTASSIUM SERPL-SCNC: 4.1 MMOL/L (ref 3.4–5.3)
PROT SERPL-MCNC: 6.8 G/DL (ref 6.4–8.3)
RBC # BLD AUTO: 4.84 10E6/UL (ref 4.4–5.9)
SODIUM SERPL-SCNC: 142 MMOL/L (ref 135–145)
WBC # BLD AUTO: 6.1 10E3/UL (ref 4–11)

## 2025-04-08 PROCEDURE — 85025 COMPLETE CBC W/AUTO DIFF WBC: CPT | Performed by: INTERNAL MEDICINE

## 2025-04-08 PROCEDURE — S9500 HIT ANTIBIOTIC Q24H DIEM: HCPCS

## 2025-04-08 PROCEDURE — 99601 HOME NFS VISIT <2 HRS: CPT

## 2025-04-08 PROCEDURE — 86140 C-REACTIVE PROTEIN: CPT | Performed by: INTERNAL MEDICINE

## 2025-04-08 PROCEDURE — 80053 COMPREHEN METABOLIC PANEL: CPT | Performed by: INTERNAL MEDICINE

## 2025-04-08 NOTE — PROGRESS NOTES
Nursing Visit Note:  Nurse visit today for CLC and labs for Reji Thomas     present during visit today: Not Applicable.    Intravenous Access:  Labs drawn without difficulty. and PICC.    Lab-Only Nursing Note    Labs obtained via PICC    Time Specimen drawn: 1230    Last dose (if applicable): No    Facility sent to: [facility] Tracking number: [id number]    Note: PT a/o NAD VSS afebrile. Denies any adverse side effects from abx. Betadine alcohol and algidex for dressing change.     Saline administered: 30 (ml)    Supply Check:   Does the patient have all the supplies they need for the next visit?  Yes    Next visit plan: One week, Tuesday April 15@ 1300    Madhavi Howell RN 4/8/2025

## 2025-04-09 ENCOUNTER — OFFICE VISIT (OUTPATIENT)
Dept: INFECTIOUS DISEASES | Facility: CLINIC | Age: 60
End: 2025-04-09
Payer: COMMERCIAL

## 2025-04-09 ENCOUNTER — TELEPHONE (OUTPATIENT)
Dept: INFECTIOUS DISEASES | Facility: CLINIC | Age: 60
End: 2025-04-09

## 2025-04-09 VITALS
WEIGHT: 157.1 LBS | OXYGEN SATURATION: 97 % | SYSTOLIC BLOOD PRESSURE: 140 MMHG | HEART RATE: 72 BPM | BODY MASS INDEX: 26.97 KG/M2 | DIASTOLIC BLOOD PRESSURE: 70 MMHG

## 2025-04-09 DIAGNOSIS — Z79.2 RECEIVING INTRAVENOUS ANTIBIOTIC TREATMENT AS OUTPATIENT: ICD-10-CM

## 2025-04-09 DIAGNOSIS — M86.159 ACUTE OSTEOMYELITIS OF PELVIC REGION, UNSPECIFIED LATERALITY (H): ICD-10-CM

## 2025-04-09 DIAGNOSIS — B95.8 STAPH INFECTION: Primary | ICD-10-CM

## 2025-04-09 PROCEDURE — S9500 HIT ANTIBIOTIC Q24H DIEM: HCPCS

## 2025-04-09 PROCEDURE — 3077F SYST BP >= 140 MM HG: CPT | Performed by: INTERNAL MEDICINE

## 2025-04-09 PROCEDURE — 99214 OFFICE O/P EST MOD 30 MIN: CPT | Performed by: INTERNAL MEDICINE

## 2025-04-09 PROCEDURE — 3078F DIAST BP <80 MM HG: CPT | Performed by: INTERNAL MEDICINE

## 2025-04-09 RX ORDER — CEFTRIAXONE SODIUM 10 G/100ML
2000 INJECTION, POWDER, FOR SOLUTION INTRAVENOUS EVERY 24 HOURS
Qty: 180 ML | Refills: 0 | Status: ACTIVE | OUTPATIENT
Start: 2025-04-09 | End: 2025-04-18

## 2025-04-09 NOTE — PROGRESS NOTES
[facility] Infectious Disease  Followup Visit        Assessment:   Recurrent drainage left thigh wound, secondary to retained infected sutures and ischial osteomyelitis, bilateral! See MRI  At some point with infected suture with staph lugdunensis  Atypical infections to be considered include mycobacterial and fungal  No signs of osteomyelitis on older MRI 2024     history of bilateral hamstring repairs at the ischial tuberosity. JERAD Nuñez within last 4 years  Received a course of Iv abx in Sep 2023 , Inter[facility], 6 weeks  In May 2024 the left wound started draining again had another debridement and a course of antibiotics>>negative cultures  On 10/25/24 he had the following procedure , when Staph lugdunesis grew from nonabsorbable sutures     2/27 #1 removal of foreign body from left ischial region with excisional debridement of skin subcutaneous tissue and muscle, #2 removal of foreign body from right pelvis bone with debridement of skin subcutaneous tissue muscle and bone, DR VELASQUEZ   In the process of debriding this tract with a rongeur I removed a tangle of suture that included a large knot and suture anchor. We cultured pieces of this material and sent 1 piece to pathology   The tissue beneath it was also friable and had the appearance of infected fat and muscle.   All THREE CULTURES GREW      2+ Staphylococcus lugdunensis Abnormal          No bone PATH sent  Only foreign bodies sent to path       Plan:   LABS reviewed  IV ceftriaxone till 4/17  Remove picc line 4/18  We are confirming with [facility]  Advise he waits before getting physically active till the PICC line is removed  Follow-up with us as needed    Giovanni Borges M.D.          Present Illness:   60 yo male presenting for clinic followup , hx of Recurrent drainage left thigh wound, infected suture material with staph lugdensis and hx of OPAT late 2023  He is now postop,on OPAT CTX,  and doing great     He's doing well postop, no abnormal  swelling, no pain no drainage incisions have healed. No fevers. Walks without problems  No side effects from the antibiotic no nausea vomiting diarrhea or rashes       Prior MRI     Radiology personally reviewed  IMPRESSION:  1.  Postoperative changes of repair of bilateral proximal hamstrings tendons at the ischial tuberosity attachments.  2.  Interval increase in size of an irregular peripherally enhancing fluid collection surrounding the partially torn fibers of the right proximal hamstrings tendons, which communicates with an additional fluid collection that tracks along the   semimembranosus and semitendinosus muscle bellies in the proximal and mid thigh and extends to the skin surface along the inferior right gluteal fold, concerning for multifocal abscesses.  3.  Similar appearance and extent of a small sinus tract extending from the posterior aspect of the left proximal thigh to the left ischial tuberosity.  4.  Increased confluent T1 hypointense marrow replacement and enhancing edema in the left ischial tuberosity, concerning for osteomyelitis.  5.  Similar extent of osteolysis involving the right ischial tuberosity with slightly increased surrounding bone marrow edema and enhancement, which may reflect reactive change although early developing osteomyelitis is not excluded.  Review of Systems  Performed and all negative except as mentioned above.    Review of Systems  Performed and all negative except as mentioned above.    Past medical, family, and social history reviewed, unchanged from before.        Physical Exam:   Gen. appearance nontoxic  Eyes no conjunctivitis or icterus  Neck no stiffness   Heart  no edema  Lungs no sob     Extremities no synovitis, trace edema  Posterior thighs show surgical incisions healed no swelling or induration  Skin  no rash or emboli  Neurologic alert oriented no focal deficits            DATA   No results found for any visits on 04/09/25.  No results found for any visits  on 03/05/25.  Collected 2/27/2025  1:47 PM       Status: Final result       Visible to patient: Yes (seen)    Specimen Information: Buttock, Right; Tissue   0 Result Notes  Culture 1+ Staphylococcus lugdunensis Abnormal          Resulting Agency: [facility]      Susceptibility              Staphylococcus lugdunensis       NARDA       Ciprofloxacin <=0.5 ug/mL Susceptible *       Clindamycin 0.25 ug/mL Susceptible       Daptomycin 0.25 ug/mL Susceptible       Doxycycline <=0.5 ug/mL Susceptible       Erythromycin <=0.25 ug/mL Susceptible       Gentamicin <=0.5 ug/mL Susceptible       Inducible macrolide resistance test Negative ug/mL Negative *       Levofloxacin 0.25 ug/mL Susceptible *       Linezolid 1 ug/mL Susceptible *       Nitrofurantoin <=16 ug/mL Susceptible *       Oxacillin 2 ug/mL Susceptible 1       Rifampin <=0.5 ug/mL Susceptible *       Tetracycline <=1 ug/mL Susceptible       Tigecycline <=0.12 ug/mL Susceptible *       Trimethoprim/Sulfamethoxazole <=0.5/9.5 u... Susceptible       Vancomycin <=0.5 ug/mL Susceptible                Giovanni Borges MD

## 2025-04-09 NOTE — TELEPHONE ENCOUNTER
[facility] contacted with order to stop medication and pull PICC line on 4/18/25, per Dr Borges.

## 2025-04-10 PROCEDURE — S9500 HIT ANTIBIOTIC Q24H DIEM: HCPCS

## 2025-04-11 ENCOUNTER — HOME INFUSION (OUTPATIENT)
Dept: HOME HEALTH SERVICES | Facility: HOME HEALTH | Age: 60
End: 2025-04-11
Payer: COMMERCIAL

## 2025-04-11 PROCEDURE — S9500 HIT ANTIBIOTIC Q24H DIEM: HCPCS

## 2025-04-12 PROCEDURE — S9500 HIT ANTIBIOTIC Q24H DIEM: HCPCS

## 2025-04-13 PROCEDURE — S9500 HIT ANTIBIOTIC Q24H DIEM: HCPCS

## 2025-04-14 ENCOUNTER — HOME INFUSION BILLING (OUTPATIENT)
Dept: HOME HEALTH SERVICES | Facility: HOME HEALTH | Age: 60
End: 2025-04-14
Payer: COMMERCIAL

## 2025-04-14 PROCEDURE — A4247 BETADINE/IODINE SWABS/WIPES: HCPCS

## 2025-04-14 PROCEDURE — A6197 ALGINATE DRSG >16 <=48 SQ IN: HCPCS

## 2025-04-14 PROCEDURE — S9500 HIT ANTIBIOTIC Q24H DIEM: HCPCS

## 2025-04-14 PROCEDURE — A4245 ALCOHOL WIPES PER BOX: HCPCS

## 2025-04-14 PROCEDURE — A9270 NON-COVERED ITEM OR SERVICE: HCPCS

## 2025-04-15 ENCOUNTER — HOME CARE VISIT (OUTPATIENT)
Dept: HOME HEALTH SERVICES | Facility: HOME HEALTH | Age: 60
End: 2025-04-15
Payer: COMMERCIAL

## 2025-04-15 ENCOUNTER — LAB REQUISITION (OUTPATIENT)
Dept: LAB | Facility: HOSPITAL | Age: 60
End: 2025-04-15
Payer: COMMERCIAL

## 2025-04-15 VITALS
DIASTOLIC BLOOD PRESSURE: 80 MMHG | OXYGEN SATURATION: 98 % | RESPIRATION RATE: 16 BRPM | SYSTOLIC BLOOD PRESSURE: 130 MMHG | TEMPERATURE: 98.1 F | HEART RATE: 68 BPM

## 2025-04-15 DIAGNOSIS — M86.9 OSTEOMYELITIS, UNSPECIFIED (H): ICD-10-CM

## 2025-04-15 LAB
ALBUMIN SERPL BCG-MCNC: 4.1 G/DL (ref 3.5–5.2)
ALP SERPL-CCNC: 92 U/L (ref 40–150)
ALT SERPL W P-5'-P-CCNC: 29 U/L (ref 0–70)
ANION GAP SERPL CALCULATED.3IONS-SCNC: 9 MMOL/L (ref 7–15)
AST SERPL W P-5'-P-CCNC: 42 U/L (ref 0–45)
BASOPHILS # BLD AUTO: 0 10E3/UL (ref 0–0.2)
BASOPHILS NFR BLD AUTO: 0 %
BILIRUB SERPL-MCNC: 0.4 MG/DL
BUN SERPL-MCNC: 16.9 MG/DL (ref 8–23)
CALCIUM SERPL-MCNC: 8.9 MG/DL (ref 8.8–10.4)
CHLORIDE SERPL-SCNC: 105 MMOL/L (ref 98–107)
CREAT SERPL-MCNC: 0.64 MG/DL (ref 0.67–1.17)
CRP SERPL-MCNC: <3 MG/L
EGFRCR SERPLBLD CKD-EPI 2021: >90 ML/MIN/1.73M2
EOSINOPHIL # BLD AUTO: 0 10E3/UL (ref 0–0.7)
EOSINOPHIL NFR BLD AUTO: 1 %
ERYTHROCYTE [DISTWIDTH] IN BLOOD BY AUTOMATED COUNT: 14.2 % (ref 10–15)
GLUCOSE SERPL-MCNC: 137 MG/DL (ref 70–99)
HCO3 SERPL-SCNC: 27 MMOL/L (ref 22–29)
HCT VFR BLD AUTO: 40.2 % (ref 40–53)
HGB BLD-MCNC: 13.3 G/DL (ref 13.3–17.7)
IMM GRANULOCYTES # BLD: 0 10E3/UL
IMM GRANULOCYTES NFR BLD: 0 %
LYMPHOCYTES # BLD AUTO: 1.2 10E3/UL (ref 0.8–5.3)
LYMPHOCYTES NFR BLD AUTO: 21 %
MCH RBC QN AUTO: 28.6 PG (ref 26.5–33)
MCHC RBC AUTO-ENTMCNC: 33.1 G/DL (ref 31.5–36.5)
MCV RBC AUTO: 87 FL (ref 78–100)
MONOCYTES # BLD AUTO: 0.3 10E3/UL (ref 0–1.3)
MONOCYTES NFR BLD AUTO: 6 %
NEUTROPHILS # BLD AUTO: 3.9 10E3/UL (ref 1.6–8.3)
NEUTROPHILS NFR BLD AUTO: 72 %
NRBC # BLD AUTO: 0 10E3/UL
NRBC BLD AUTO-RTO: 0 /100
PLATELET # BLD AUTO: 177 10E3/UL (ref 150–450)
POTASSIUM SERPL-SCNC: 3.8 MMOL/L (ref 3.4–5.3)
PROT SERPL-MCNC: 6.5 G/DL (ref 6.4–8.3)
RBC # BLD AUTO: 4.65 10E6/UL (ref 4.4–5.9)
SODIUM SERPL-SCNC: 141 MMOL/L (ref 135–145)
WBC # BLD AUTO: 5.5 10E3/UL (ref 4–11)

## 2025-04-15 PROCEDURE — 82040 ASSAY OF SERUM ALBUMIN: CPT | Performed by: INTERNAL MEDICINE

## 2025-04-15 PROCEDURE — 86140 C-REACTIVE PROTEIN: CPT | Performed by: INTERNAL MEDICINE

## 2025-04-15 PROCEDURE — 82310 ASSAY OF CALCIUM: CPT | Performed by: INTERNAL MEDICINE

## 2025-04-15 PROCEDURE — S9500 HIT ANTIBIOTIC Q24H DIEM: HCPCS

## 2025-04-15 PROCEDURE — 99601 HOME NFS VISIT <2 HRS: CPT

## 2025-04-15 PROCEDURE — 85004 AUTOMATED DIFF WBC COUNT: CPT | Performed by: INTERNAL MEDICINE

## 2025-04-15 NOTE — PROGRESS NOTES
Nursing Visit Note:  Nurse visit today for clc labs for Reji SolorzanoDavid     present during visit today: Not Applicable.    Intravenous Access:  Labs drawn without difficulty. and PICC.    Lab-Only Nursing Note    Labs obtained via PICC    Time Specimen drawn: 1330    Last dose (if applicable): No    Facility sent to: [facility] Tracking number: [id number]    Note: patient states he is feeling well. Denies any pain, infusions are going well. Denies any side effects. CLC completed using alcohol, Betadine, Algidex and IV 3000.    Saline administered: 30 (ml)    Supply Check:   Does the patient have all the supplies they need for the next visit?  Yes    Next visit plan: Friday 4/18/25 at 12 PM for Picc line removal per orders    Dominga Metz RN 4/15/2025

## 2025-04-16 PROCEDURE — S9500 HIT ANTIBIOTIC Q24H DIEM: HCPCS

## 2025-04-17 PROCEDURE — S9500 HIT ANTIBIOTIC Q24H DIEM: HCPCS

## 2025-04-18 PROCEDURE — S9500 HIT ANTIBIOTIC Q24H DIEM: HCPCS

## (undated) DEVICE — SU PDS II 2-0 CT-2 27"  Z333H

## (undated) DEVICE — DRSG PRIMAPORE 02X3" 7133

## (undated) DEVICE — STRAP KNEE/BODY 31143004

## (undated) DEVICE — APPLICATORS COTTON TIP 6"X2 STERILE LF C15053-006

## (undated) DEVICE — DRAPE U-DRAPE 1015NSD NON-STERILE

## (undated) DEVICE — PREP CHLORAPREP 26ML TINTED HI-LITE ORANGE 930815

## (undated) DEVICE — TUBE CULTURE AEROBIC/ANAEROBIC W/O SWABS A.C.T.I.1. 12401

## (undated) DEVICE — PREP POVIDONE-IODINE 10% SOLUTION 4OZ BOTTLE MDS093944

## (undated) DEVICE — SU VICRYL 2-0 CT-1 27" UND J259H

## (undated) DEVICE — SU MONOCRYL 4-0 PS-2 18" UND Y496G

## (undated) DEVICE — CONTAINER SPECIMEN 4OZ STERILE 17099

## (undated) DEVICE — LINEN ORTHO PACK 5446

## (undated) DEVICE — SUCTION MANIFOLD NEPTUNE 2 SYS 4 PORT 0702-020-000

## (undated) DEVICE — GLOVE BIOGEL PI MICRO SZ 7.0 48570

## (undated) DEVICE — LINEN TOWEL PACK X5 5464

## (undated) DEVICE — TAPE MEDIPORE 4"X2YD 2864

## (undated) DEVICE — MITT SURGICAL PREP HAIR REMOVER LATEX FREE 617142

## (undated) DEVICE — BLADE CLIPPER SGL USE 9680

## (undated) DEVICE — PREP POVIDONE-IODINE 7.5% SCRUB 4OZ BOTTLE MDS093945

## (undated) DEVICE — GOWN XLG DISP 9545

## (undated) DEVICE — SU VICRYL 0 CT-1 27" J340H

## (undated) DEVICE — BLADE KNIFE SURG 15 371115

## (undated) DEVICE — SYR 10ML FINGER CONTROL W/O NDL 309695

## (undated) DEVICE — PACK TOTAL HIP W/U DRAPE RIVERSIDE LATEX FREE

## (undated) DEVICE — SU SILK 3-0 SH 30" K832H

## (undated) DEVICE — ESU GROUND PAD UNIVERSAL W/O CORD

## (undated) DEVICE — SU DERMABOND ADVANCED .7ML DNX12

## (undated) DEVICE — SOL NACL 0.9% IRRIG 3000ML BAG 2B7477

## (undated) RX ORDER — BUPIVACAINE HYDROCHLORIDE 2.5 MG/ML
INJECTION, SOLUTION EPIDURAL; INFILTRATION; INTRACAUDAL
Status: DISPENSED
Start: 2024-10-24

## (undated) RX ORDER — ACETAMINOPHEN 325 MG/1
TABLET ORAL
Status: DISPENSED
Start: 2024-10-24

## (undated) RX ORDER — FENTANYL CITRATE 50 UG/ML
INJECTION, SOLUTION INTRAMUSCULAR; INTRAVENOUS
Status: DISPENSED
Start: 2024-10-24